# Patient Record
Sex: FEMALE | Race: WHITE | Employment: UNEMPLOYED | URBAN - METROPOLITAN AREA
[De-identification: names, ages, dates, MRNs, and addresses within clinical notes are randomized per-mention and may not be internally consistent; named-entity substitution may affect disease eponyms.]

---

## 2017-03-03 ENCOUNTER — OFFICE VISIT (OUTPATIENT)
Dept: FAMILY MEDICINE CLINIC | Age: 47
End: 2017-03-03

## 2017-03-03 VITALS
TEMPERATURE: 98.9 F | HEIGHT: 59 IN | RESPIRATION RATE: 12 BRPM | SYSTOLIC BLOOD PRESSURE: 131 MMHG | HEART RATE: 74 BPM | OXYGEN SATURATION: 99 % | BODY MASS INDEX: 24.8 KG/M2 | WEIGHT: 123 LBS | DIASTOLIC BLOOD PRESSURE: 82 MMHG

## 2017-03-03 DIAGNOSIS — H69.82 EUSTACHIAN TUBE DYSFUNCTION, LEFT: Primary | ICD-10-CM

## 2017-03-03 DIAGNOSIS — J02.9 PHARYNGITIS, UNSPECIFIED ETIOLOGY: ICD-10-CM

## 2017-03-03 RX ORDER — AMOXICILLIN 400 MG/5ML
500 POWDER, FOR SUSPENSION ORAL 3 TIMES DAILY
Qty: 189 ML | Refills: 0 | Status: SHIPPED | OUTPATIENT
Start: 2017-03-03 | End: 2017-03-13

## 2017-03-03 NOTE — MR AVS SNAPSHOT
Visit Information Date & Time Provider Department Dept. Phone Encounter #  
 3/3/2017  3:30 PM Vignesh Alannah, [facility] [phone] [id number] Follow-up Instructions Return if symptoms worsen or fail to improve. Upcoming Health Maintenance Date Due  
 PAP AKA CERVICAL CYTOLOGY 11/11/2018 DTaP/Tdap/Td series (2 - Td) 3/3/2027 Allergies as of 3/3/2017  Review Complete On: 3/3/2017 By: Vignesh Jaffe NP Severity Noted Reaction Type Reactions Augmentin [Amoxicillin-pot Clavulanate]  02/21/2014    Rash Current Immunizations  Never Reviewed No immunizations on file. Not reviewed this visit You Were Diagnosed With   
  
 Codes Comments Eustachian tube dysfunction, left    -  Primary ICD-10-CM: X39.81 ICD-9-CM: 381.81 Pharyngitis, unspecified etiology     ICD-10-CM: J02.9 ICD-9-CM: 198 Vitals BP Pulse Temp Resp Height(growth percentile) Weight(growth percentile) 131/82 (BP 1 Location: Left arm, BP Patient Position: Sitting) 74 98.9 °F (37.2 °C) (Oral) 12 4' 11\" (1.499 m) 123 lb (55.8 kg) LMP SpO2 BMI OB Status Smoking Status 02/03/2017 (Approximate) 99% 24.84 kg/m2 Having regular periods Never Smoker Vitals History BMI and BSA Data Body Mass Index Body Surface Area  
 24.84 kg/m 2 1.52 m 2 Preferred Pharmacy Pharmacy Name Phone [id number] [address] [place] [address] [phone] Your Updated Medication List  
  
   
This list is accurate as of: 3/3/17  4:10 PM.  Always use your most recent med list.  
  
  
  
  
 amoxicillin 400 mg/5 mL suspension Commonly known as:  AMOXIL Take 6.3 mL by mouth three (3) times daily for 10 days. azelaic acid 15 % topical gel Commonly known as:  [employer] Apply  to affected area two (2) times a day. ibuprofen 200 mg tablet Commonly known as:  MOTRIN  
 Take  by mouth. ondansetron hcl 4 mg tablet Commonly known as:  Marcos Marion Take 4 mg by mouth every eight (8) hours as needed for Nausea. PROBIOTIC 4X 10-15 mg Tbec Generic drug:  B.infantis-B.ani-B.long-B.bifi Take  by mouth. ZANTAC PO Take  by mouth. ZyrTEC 10 mg Cap Generic drug:  Cetirizine Take  by mouth. Prescriptions Sent to Pharmacy Refills  
 amoxicillin (AMOXIL) 400 mg/5 mL suspension 0 Sig: Take 6.3 mL by mouth three (3) times daily for 10 days. Class: Normal  
 Pharmacy: [facility] #: [phone] Route: Oral  
  
Follow-up Instructions Return if symptoms worsen or fail to improve. Patient Instructions Eustachian Tube Problems: Care Instructions Your Care Instructions The eustachian (say \"you-STAY-shee-un\") tubes run between the inside of the ears and the throat. They keep air pressure stable in the ears. If your eustachian tubes become blocked, the air pressure in your ears changes. The fluids from a cold can clog eustachian tubes, causing pain in the ears. A quick change in air pressure can cause eustachian tubes to close up. This might happen when an airplane changes altitude or when a  goes up or down underwater. Eustachian tube problems often clear up on their own or after antibiotic treatment. If your tubes continue to be blocked, you may need surgery. Follow-up care is a key part of your treatment and safety. Be sure to make and go to all appointments, and call your doctor if you are having problems. It's also a good idea to know your test results and keep a list of the medicines you take. How can you care for yourself at home? · To ease ear pain, apply a warm washcloth or a heating pad set on low. There may be some drainage from the ear when the heat melts earwax.  Put a cloth between the heat source and your skin. Do not use a heating pad with children. · If your doctor prescribed antibiotics, take them as directed. Do not stop taking them just because you feel better. You need to take the full course of antibiotics. · Your doctor may recommend over-the-counter medicine. Be safe with medicines. Oral or nasal decongestants may relieve ear pain. Avoid decongestants that are combined with antihistamines, which tend to cause more blockage. But if allergies seem to be the problem, your doctor may recommend a combination. Be careful with cough and cold medicines. Don't give them to children younger than 6, because they don't work for children that age and can even be harmful. For children 6 and older, always follow all the instructions carefully. Make sure you know how much medicine to give and how long to use it. And use the dosing device if one is included. When should you call for help? Call your doctor now or seek immediate medical care if: 
· You develop sudden, complete hearing loss. · You have severe pain or feel dizzy. · You have new or increasing pus or blood draining from your ear. · You have redness, swelling, or pain around or behind the ear. Watch closely for changes in your health, and be sure to contact your doctor if: 
· You do not get better after 2 weeks. · You have any new symptoms, such as itching or a feeling of fullness in the ear. Where can you learn more? Go to http://leigh-pinky.info/. Enter Y822 in the search box to learn more about \"Eustachian Tube Problems: Care Instructions. \" Current as of: July 29, 2016 Content Version: 11.1 © 6216-1512 [employer]. Care instructions adapted under license by [employer] (which disclaims liability or warranty for this information).  If you have questions about a medical condition or this instruction, always ask your healthcare professional. Rima Dinh, Incorporated disclaims any warranty or liability for your use of this information. Introducing [facility]! Dear Dudley Kater: Thank you for requesting a Opp.io account. Our records indicate that you already have an active Opp.io account. You can access your account anytime at [email or web address] Did you know that you can access your hospital and ER discharge instructions at any time in Opp.io? You can also review all of your test results from your hospital stay or ER visit. Additional Information If you have questions, please visit the Frequently Asked Questions section of the Opp.io website at [email or web address] Remember, Opp.io is NOT to be used for urgent needs. For medical emergencies, dial 911. Now available from your iPhone and Android! Please provide this summary of care documentation to your next provider. Your primary care clinician is listed as David Mckenzie. If you have any questions after today's visit, please call [phone].

## 2017-03-03 NOTE — PATIENT INSTRUCTIONS
Eustachian Tube Problems: Care Instructions  Your Care Instructions    The eustachian (say \"you-STAY-shee-un\") tubes run between the inside of the ears and the throat. They keep air pressure stable in the ears. If your eustachian tubes become blocked, the air pressure in your ears changes. The fluids from a cold can clog eustachian tubes, causing pain in the ears. A quick change in air pressure can cause eustachian tubes to close up. This might happen when an airplane changes altitude or when a  goes up or down underwater. Eustachian tube problems often clear up on their own or after antibiotic treatment. If your tubes continue to be blocked, you may need surgery. Follow-up care is a key part of your treatment and safety. Be sure to make and go to all appointments, and call your doctor if you are having problems. It's also a good idea to know your test results and keep a list of the medicines you take. How can you care for yourself at home? · To ease ear pain, apply a warm washcloth or a heating pad set on low. There may be some drainage from the ear when the heat melts earwax. Put a cloth between the heat source and your skin. Do not use a heating pad with children. · If your doctor prescribed antibiotics, take them as directed. Do not stop taking them just because you feel better. You need to take the full course of antibiotics. · Your doctor may recommend over-the-counter medicine. Be safe with medicines. Oral or nasal decongestants may relieve ear pain. Avoid decongestants that are combined with antihistamines, which tend to cause more blockage. But if allergies seem to be the problem, your doctor may recommend a combination. Be careful with cough and cold medicines. Don't give them to children younger than 6, because they don't work for children that age and can even be harmful. For children 6 and older, always follow all the instructions carefully.  Make sure you know how much medicine to give and how long to use it. And use the dosing device if one is included. When should you call for help? Call your doctor now or seek immediate medical care if:  · You develop sudden, complete hearing loss. · You have severe pain or feel dizzy. · You have new or increasing pus or blood draining from your ear. · You have redness, swelling, or pain around or behind the ear. Watch closely for changes in your health, and be sure to contact your doctor if:  · You do not get better after 2 weeks. · You have any new symptoms, such as itching or a feeling of fullness in the ear. Where can you learn more? Go to http://leigh-pinky.info/. Enter Y822 in the search box to learn more about \"Eustachian Tube Problems: Care Instructions. \"  Current as of: July 29, 2016  Content Version: 11.1  © 8231-6654 Healthwise, Incorporated. Care instructions adapted under license by [employer] (which disclaims liability or warranty for this information). If you have questions about a medical condition or this instruction, always ask your healthcare professional. [address] any warranty or liability for your use of this information.

## 2017-03-03 NOTE — PROGRESS NOTES
HISTORY OF PRESENT ILLNESS  Lori Schwartz is a 40 y.o. female. HPI  Ear Pain  Patient complains of left ear pain and sore throat. Onset of symptoms was 1 week ago, unchanged since that time. She also notes no hearing loss, no drainage. She does not have a history of ear infections. She took Advil with mild relief. Review of Systems   Constitutional: Positive for malaise/fatigue. Negative for chills and fever. HENT: Positive for congestion, ear pain and sore throat. Respiratory: Positive for cough and sputum production. Negative for shortness of breath and wheezing. Neurological: Negative for headaches. Physical Exam   Constitutional: She is oriented to person, place, and time. She appears well-developed and well-nourished. No distress. HENT:   Right Ear: Tympanic membrane and ear canal normal.   Left Ear: Tympanic membrane and ear canal normal.   Nose: Mucosal edema present. Right sinus exhibits no maxillary sinus tenderness and no frontal sinus tenderness. Left sinus exhibits no maxillary sinus tenderness and no frontal sinus tenderness. Mouth/Throat: Oropharynx is clear and moist.   Cardiovascular: Normal rate, regular rhythm and normal heart sounds. No murmur heard. Pulmonary/Chest: Effort normal and breath sounds normal. She has no decreased breath sounds. She has no wheezes. She has no rhonchi. Lymphadenopathy:     She has no cervical adenopathy. Neurological: She is alert and oriented to person, place, and time. Psychiatric: She has a normal mood and affect. Her behavior is normal.   Nursing note and vitals reviewed. ASSESSMENT and PLAN  Zahira Hernandez was seen today for sore throat and ear pain. Diagnoses and all orders for this visit:    Eustachian tube dysfunction, left  Flonase nasal spray daily. Increase fluids. Warm compresses. Will cover with Amoxicillin. -     amoxicillin (AMOXIL) 400 mg/5 mL suspension;  Take 6.3 mL by mouth three (3) times daily for 10 days.    Pharyngitis, unspecified etiology  Advil PRN. Increase fluids. I have discussed the diagnosis with the patient and the intended plan as seen in the above orders. The patient has received an after-visit summary and questions were answered concerning future plans. I have discussed medication side effects and warnings with the patient as well. Follow-up Disposition:  Return if symptoms worsen or fail to improve.

## 2017-03-03 NOTE — PROGRESS NOTES
1. Have you been to the ER, urgent care clinic since your last visit? Hospitalized since your last visit[facility]- ear pain    2. Have you seen or consulted any other health care providers outside of the [address] [place] [address] since your last visit? Include any pap smears or colon screening.  No      Chief Complaint   Patient presents with    Sore Throat    Ear Pain     left ear for the last weeks

## 2017-03-28 ENCOUNTER — OFFICE VISIT (OUTPATIENT)
Dept: FAMILY MEDICINE CLINIC | Age: 47
End: 2017-03-28

## 2017-03-28 VITALS
SYSTOLIC BLOOD PRESSURE: 111 MMHG | WEIGHT: 122.6 LBS | DIASTOLIC BLOOD PRESSURE: 78 MMHG | HEIGHT: 59 IN | BODY MASS INDEX: 24.72 KG/M2 | TEMPERATURE: 97.8 F | OXYGEN SATURATION: 98 % | RESPIRATION RATE: 16 BRPM | HEART RATE: 66 BPM

## 2017-03-28 DIAGNOSIS — Z88.9 H/O SEASONAL ALLERGIES: ICD-10-CM

## 2017-03-28 DIAGNOSIS — J02.9 SORE THROAT: Primary | ICD-10-CM

## 2017-03-28 LAB
S PYO AG THROAT QL: NEGATIVE
VALID INTERNAL CONTROL?: YES

## 2017-03-28 RX ORDER — MINERAL OIL
180 ENEMA (ML) RECTAL DAILY
Qty: 30 TAB | Refills: 3 | Status: SHIPPED | OUTPATIENT
Start: 2017-03-28

## 2017-03-28 RX ORDER — MOMETASONE FUROATE 50 UG/1
2 SPRAY, METERED NASAL DAILY
COMMUNITY

## 2017-03-28 NOTE — MR AVS SNAPSHOT
Visit Information Date & Time Provider Department Dept. Phone Encounter #  
 3/28/2017 10:40 AM Birgit Samaniego  [facility] [address] [facility] [address] [phone] [id number] Upcoming Health Maintenance Date Due  
 PAP AKA CERVICAL CYTOLOGY 11/11/2018 DTaP/Tdap/Td series (2 - Td) 3/3/2027 Allergies as of 3/28/2017  Review Complete On: 3/28/2017 By: Birgit Samaniego NP Severity Noted Reaction Type Reactions Augmentin [Amoxicillin-pot Clavulanate]  02/21/2014    Rash Current Immunizations  Never Reviewed No immunizations on file. Not reviewed this visit You Were Diagnosed With   
  
 Codes Comments Sore throat    -  Primary ICD-10-CM: J02.9 ICD-9-CM: 221 H/O seasonal allergies     ICD-10-CM: Z88.9 ICD-9-CM: V15.09 Vitals BP Pulse Temp Resp Height(growth percentile) Weight(growth percentile) 111/78 (BP 1 Location: Left arm, BP Patient Position: Sitting) 66 97.8 °F (36.6 °C) (Oral) 16 4' 11\" (1.499 m) 122 lb 9.6 oz (55.6 kg) LMP SpO2 BMI OB Status Smoking Status 03/13/2017 (Exact Date) 98% 24.76 kg/m2 Having regular periods Never Smoker Vitals History BMI and BSA Data Body Mass Index Body Surface Area 24.76 kg/m 2 1.52 m 2 Preferred Pharmacy Pharmacy Name Phone [id number] [place] [facility] [phone] Your Updated Medication List  
  
   
This list is accurate as of: 3/28/17 11:31 AM.  Always use your most recent med list.  
  
  
  
  
 azelaic acid 15 % topical gel Commonly known as:  [employer] Apply  to affected area two (2) times a day. fexofenadine 180 mg tablet Commonly known as:  [place] Take 1 Tab by mouth daily. Indications: SEASONAL ALLERGIC RHINITIS  
  
 ibuprofen 200 mg tablet Commonly known as:  MOTRIN Take  by mouth. NASONEX 50 mcg/actuation nasal spray Generic drug:  mometasone 2 Sprays daily. ondansetron hcl 4 mg tablet Commonly known as:  Joelle Loss Take 4 mg by mouth every eight (8) hours as needed for Nausea. PROBIOTIC 4X 10-15 mg Tbec Generic drug:  B.infantis-B.ani-B.long-B.bifi Take  by mouth. ZANTAC PO Take  by mouth. Prescriptions Sent to Pharmacy Refills  
 fexofenadine (ALLEGRA) 180 mg tablet 3 Sig: Take 1 Tab by mouth daily. Indications: SEASONAL ALLERGIC RHINITIS Class: Normal  
 Pharmacy: [facility] #: [phone] Route: Oral  
  
Patient Instructions Sore Throat: Care Instructions Your Care Instructions Infection by bacteria or a virus causes most sore throats. Cigarette smoke, dry air, air pollution, allergies, and yelling can also cause a sore throat. Sore throats can be painful and annoying. Fortunately, most sore throats go away on their own. If you have a bacterial infection, your doctor may prescribe antibiotics. Follow-up care is a key part of your treatment and safety. Be sure to make and go to all appointments, and call your doctor if you are having problems. It's also a good idea to know your test results and keep a list of the medicines you take. How can you care for yourself at home? · If your doctor prescribed antibiotics, take them as directed. Do not stop taking them just because you feel better. You need to take the full course of antibiotics. · Gargle with warm salt water once an hour to help reduce swelling and relieve discomfort. Use 1 teaspoon of salt mixed in 1 cup of warm water. · Take an over-the-counter pain medicine, such as acetaminophen (Tylenol), ibuprofen (Advil, Motrin), or naproxen (Aleve). Read and follow all instructions on the label. · Be careful when taking over-the-counter cold or flu medicines and Tylenol at the same time.  Many of these medicines have acetaminophen, which is Tylenol. Read the labels to make sure that you are not taking more than the recommended dose. Too much acetaminophen (Tylenol) can be harmful. · Drink plenty of fluids. Fluids may help soothe an irritated throat. Hot fluids, such as tea or soup, may help decrease throat pain. · Use over-the-counter throat lozenges to soothe pain. Regular cough drops or hard candy may also help. These should not be given to young children because of the risk of choking. · Do not smoke or allow others to smoke around you. If you need help quitting, talk to your doctor about stop-smoking programs and medicines. These can increase your chances of quitting for good. · Use a vaporizer or humidifier to add moisture to your bedroom. Follow the directions for cleaning the machine. When should you call for help? Call your doctor now or seek immediate medical care if: 
· You have new or worse trouble swallowing. · Your sore throat gets much worse on one side. Watch closely for changes in your health, and be sure to contact your doctor if you do not get better as expected. Where can you learn more? Go to http://leigh-pinky.info/. Enter 062 [id number] in the search box to learn more about \"Sore Throat: Care Instructions. \" Current as of: July 29, 2016 Content Version: 11.1 © 5875-2092 Sara Campbell, Incorporated. Care instructions adapted under license by [employer] (which disclaims liability or warranty for this information). If you have questions about a medical condition or this instruction, always ask your healthcare professional. [place] [address] any warranty or liability for your use of this information. Introducing [facility]! [facility]: Thank you for requesting a [employer] account. Our records indicate that you already have an active [employer] account. You can access your account anytime at [email or web address] Did you know that you can access your hospital and ER discharge instructions at any time in [employer]? You can also review all of your test results from your hospital stay or ER visit. Additional Information If you have questions, please visit the Frequently Asked Questions section of the [employer] website at [email or web address] Remember, [employer] is NOT to be used for urgent needs. For medical emergencies, dial 911. Now available from your iPhone and Android! Please provide this summary of care documentation to your next provider. Your primary care clinician is listed as Preeti Adas. If you have any questions after today's visit, please call [phone].

## 2017-03-28 NOTE — PROGRESS NOTES
Chief Complaint   Patient presents with    Sore Throat     sore throat, fatigue X 2 days      1. Have you been to the ER, urgent care clinic since your last visit? Hospitalized since your last visit? No    2. Have you seen or consulted any other health care providers outside of the [address] [place] [address] since your last visit? Include any pap smears or colon screening.  No

## 2017-03-28 NOTE — PROGRESS NOTES
HISTORY OF PRESENT ILLNESS  Fede Puentes is a 40 y.o. female. HPI  Patient presents to office today for sore throat that started over the past few days, mild discomfort. Today she complains of fatigue, both children tested positive. ROS  See above  Visit Vitals    /78 (BP 1 Location: Left arm, BP Patient Position: Sitting)    Pulse 66    Temp 97.8 °F (36.6 °C) (Oral)    Resp 16    Ht 4' 11\" (1.499 m)    Wt 122 lb 9.6 oz (55.6 kg)    LMP 03/13/2017 (Exact Date)    SpO2 98%    BMI 24.76 kg/m2     Current Outpatient Prescriptions on File Prior to Visit   Medication Sig Dispense Refill    B.infantis-B.ani-B.long-B.bifi (PROBIOTIC 4X) 10-15 mg TbEC Take  by mouth.  azelaic acid (FINACEA) 15 % topical gel Apply  to affected area two (2) times a day.  RANITIDINE HCL (ZANTAC PO) Take  by mouth.  Cetirizine (ZYRTEC) 10 mg cap Take  by mouth.  ibuprofen (MOTRIN) 200 mg tablet Take  by mouth.  ondansetron hcl (ZOFRAN) 4 mg tablet Take 4 mg by mouth every eight (8) hours as needed for Nausea. No current facility-administered medications on file prior to visit. No results found for this or any previous visit (from the past 12 hour(s)). Physical Exam   Constitutional: She is oriented to person, place, and time. She appears well-developed and well-nourished. HENT:   Head: Normocephalic and atraumatic. Neck: Normal range of motion. Cardiovascular: Normal rate and regular rhythm. Pulmonary/Chest: Effort normal and breath sounds normal.   Neurological: She is alert and oriented to person, place, and time. Skin: Skin is warm and dry. Psychiatric: She has a normal mood and affect. Strept test neg   ASSESSMENT and PLAN  Marie Santizo was seen today for sore throat.     Diagnoses and all orders for this visit:    Sore throat    Warm salt gargle up to 4 times if symptoms does not improve follow up with PCP    Justin Santizo NP

## 2017-03-29 ENCOUNTER — TELEPHONE (OUTPATIENT)
Dept: FAMILY MEDICINE CLINIC | Age: 47
End: 2017-03-29

## 2017-03-29 NOTE — TELEPHONE ENCOUNTER
Patient is calling, patient states that she would like a call back from ISAI sullivan in regards to what they spoke about in her appointment yesterday (fatigue). Patient states that she would like to request that some blood work orders be put into the system so that she can be tested for something that may be causing this.      Best call back # for patient: [phone]

## 2017-03-30 NOTE — TELEPHONE ENCOUNTER
Returned call to patient, adv her that she would need a second appointment, not to see acute care. Since PCP is out on leave, pt will make an appt with another provider. Patient/ Caller given an opportunity to ask questions, repeated information, and verbalized understanding.

## 2017-04-03 PROBLEM — J30.9 ALLERGIC RHINITIS: Status: ACTIVE | Noted: 2017-04-03

## 2017-10-23 ENCOUNTER — OFFICE VISIT (OUTPATIENT)
Dept: FAMILY MEDICINE CLINIC | Age: 47
End: 2017-10-23

## 2017-10-23 VITALS
HEART RATE: 81 BPM | RESPIRATION RATE: 18 BRPM | OXYGEN SATURATION: 98 % | WEIGHT: 123.2 LBS | TEMPERATURE: 99 F | DIASTOLIC BLOOD PRESSURE: 68 MMHG | HEIGHT: 59 IN | BODY MASS INDEX: 24.84 KG/M2 | SYSTOLIC BLOOD PRESSURE: 114 MMHG

## 2017-10-23 DIAGNOSIS — H53.8 BLURRY VISION, BILATERAL: ICD-10-CM

## 2017-10-23 DIAGNOSIS — N39.41 URGE INCONTINENCE OF URINE: ICD-10-CM

## 2017-10-23 DIAGNOSIS — M54.6 ACUTE MIDLINE THORACIC BACK PAIN: Primary | ICD-10-CM

## 2017-10-23 NOTE — PATIENT INSTRUCTIONS
Urge Incontinence in Women: Care Instructions  Your Care Instructions  Urge incontinence occurs when the need to urinate is so strong that you cannot reach the toilet in time, even when your bladder contains only a small amount of urine. This is also called overactive bladder or unstable bladder. Some women may have no warning before they leak urine. This condition does not cause major health problems, but it can be embarrassing and can affect a woman's self-esteem and confidence. Treatment can cure or improve your symptoms. Follow-up care is a key part of your treatment and safety. Be sure to make and go to all appointments, and call your doctor if you are having problems. It's also a good idea to know your test results and keep a list of the medicines you take. How can you care for yourself at home? · Take your medicines exactly as prescribed. Call your doctor if you think you are having a problem with your medicine. You will get more details on the specific medicines your doctor prescribes. · Limit caffeine and alcohol--they stimulate urine production. · Urinate every 2 to 4 hours during waking hours, even if you feel that you do not have to go. · Do pelvic floor (Kegel) exercises, which tighten and strengthen pelvic muscles. To do Kegel exercises:  ¨ Squeeze the same muscles you would use to stop your urine. Your belly and thighs should not move. ¨ Hold the squeeze for 3 seconds, then relax for 3 seconds. ¨ Start with 3 seconds. Then add 1 second each week until you are able to squeeze for 10 seconds. ¨ Repeat the exercise 10 to 15 times for each session. Do three or more sessions each day. · Try wearing pads that absorb the leaks. · Keep skin in the genital area dry. When should you call for help? Call your doctor now or seek immediate medical care if:  · You have symptoms of a urinary infection. For example:  ¨ You have blood or pus in your urine.   ¨ You have pain in your back just below your rib cage. This is called flank pain. ¨ You have a fever, chills, or body aches. ¨ It hurts to urinate. ¨ You have groin or belly pain. Watch closely for changes in your health, and be sure to contact your doctor if:  · You have a hard time urinating when your bladder feels full. · You feel like you need to urinate often. · Your bladder feels full even after you urinate. · Your symptoms are getting worse. Where can you learn more? Go to http://leigh-pinky.info/. Enter F819 in the search box to learn more about \"Urge Incontinence in Women: Care Instructions. \"  Current as of: October 13, 2016  Content Version: 11.3  © 7863-8579 [employer]. Care instructions adapted under license by [employer] (which disclaims liability or warranty for this information). If you have questions about a medical condition or this instruction, always ask your healthcare professional. [place] [address] any warranty or liability for your use of this information. Musculoskeletal Pain: Care Instructions  Your Care Instructions  Different problems with the bones, muscles, nerves, ligaments, and tendons in the body can cause pain. One or more areas of your body may ache or burn. Or they may feel tired, stiff, or sore. The medical term for this type of pain is musculoskeletal pain. It can have many different causes. Sometimes the pain is caused by an injury such as a strain or sprain. Or you might have pain from using one part of your body in the same way over and over again. This is called overuse. In some cases, the cause of the pain is another health problem such as arthritis or fibromyalgia. The doctor will examine you and ask you questions about your health to help find the cause of your pain. Blood tests or imaging tests like an X-ray may also be helpful. But sometimes doctors can't find a cause of the pain.   Treatment depends on your symptoms and the cause of the pain, if known. The doctor has checked you carefully, but problems can develop later. If you notice any problems or new symptoms, get medical treatment right away. Follow-up care is a key part of your treatment and safety. Be sure to make and go to all appointments, and call your doctor if you are having problems. It's also a good idea to know your test results and keep a list of the medicines you take. How can you care for yourself at home? · Rest until you feel better. · Do not do anything that makes the pain worse. Return to exercise gradually if you feel better and your doctor says it's okay. · Be safe with medicines. Read and follow all instructions on the label. ¨ If the doctor gave you a prescription medicine for pain, take it as prescribed. ¨ If you are not taking a prescription pain medicine, ask your doctor if you can take an over-the-counter medicine. · Put ice or a cold pack on the area for 10 to 20 minutes at a time to ease pain. Put a thin cloth between the ice and your skin. When should you call for help? Call your doctor now or seek immediate medical care if:  · You have new pain, or your pain gets worse. · You have new symptoms such as a fever, a rash, or chills. Watch closely for changes in your health, and be sure to contact your doctor if:  · You do not get better as expected. Where can you learn more? Go to http://leigh-pinky.info/. Enter G342 in the search box to learn more about \"Musculoskeletal Pain: Care Instructions. \"  Current as of: October 14, 2016  Content Version: 11.3  © 8842-9462 [employer]. Care instructions adapted under license by [employer] (which disclaims liability or warranty for this information). If you have questions about a medical condition or this instruction, always ask your healthcare professional. [address] any warranty or liability for your use of this information.

## 2017-10-23 NOTE — MR AVS SNAPSHOT
Visit Information Date & Time Provider Department Dept. Phone Encounter #  
 10/23/2017  3:00 PM Yfn Jackson  [facility] [phone] [id number] Upcoming Health Maintenance Date Due INFLUENZA AGE 9 TO ADULT 8/1/2017 PAP AKA CERVICAL CYTOLOGY 11/11/2018 DTaP/Tdap/Td series (2 - Td) 3/3/2027 Allergies as of 10/23/2017  Review Complete On: 10/23/2017 By: Billy Parkinson LPN Severity Noted Reaction Type Reactions Augmentin [Amoxicillin-pot Clavulanate]  02/21/2014    Rash Current Immunizations  Never Reviewed No immunizations on file. Not reviewed this visit You Were Diagnosed With   
  
 Codes Comments Acute midline thoracic back pain    -  Primary ICD-10-CM: M54.6 ICD-9-CM: 724.1 Urge incontinence of urine     ICD-10-CM: N39.41 
ICD-9-CM: 788.31 Blurry vision, bilateral     ICD-10-CM: H53.8 ICD-9-CM: 368.8 Vitals BP Pulse Temp Resp Height(growth percentile) Weight(growth percentile) 114/68 (BP 1 Location: Right arm, BP Patient Position: Sitting) 81 99 °F (37.2 °C) (Oral) 18 4' 11\" (1.499 m) 123 lb 3.2 oz (55.9 kg) LMP SpO2 BMI OB Status Smoking Status 09/30/2017 (Approximate) 98% 24.88 kg/m2 Having regular periods Never Smoker BMI and BSA Data Body Mass Index Body Surface Area  
 24.88 kg/m 2 1.53 m 2 Preferred Pharmacy Pharmacy Name Phone [id number] [phone] Your Updated Medication List  
  
   
This list is accurate as of: 10/23/17  4:06 PM.  Always use your most recent med list.  
  
  
  
  
 azelaic acid 15 % topical gel Commonly known as:  [employer] Apply  to affected area two (2) times a day. fexofenadine 180 mg tablet Commonly known as:  Lucius Willis Take 1 Tab by mouth daily. Indications: SEASONAL ALLERGIC RHINITIS  
  
 ibuprofen 200 mg tablet Commonly known as:  MOTRIN Take  by mouth. NASONEX 50 mcg/actuation nasal spray Generic drug:  mometasone 2 Sprays daily. ondansetron hcl 4 mg tablet Commonly known as:  Jesus Manuel Card Take 4 mg by mouth every eight (8) hours as needed for Nausea. PROBIOTIC 4X 10-15 mg Tbec Generic drug:  B.infantis-B.ani-B.long-B.bifi Take  by mouth. ZANTAC PO Take  by mouth. To-Do List   
 10/23/2017 Imaging:  XR SPINE THORAC 3 V Patient Instructions Urge Incontinence in Women: Care Instructions Your Care Instructions Urge incontinence occurs when the need to urinate is so strong that you cannot reach the toilet in time, even when your bladder contains only a small amount of urine. This is also called overactive bladder or unstable bladder. Some women may have no warning before they leak urine. This condition does not cause major health problems, but it can be embarrassing and can affect a woman's self-esteem and confidence. Treatment can cure or improve your symptoms. Follow-up care is a key part of your treatment and safety. Be sure to make and go to all appointments, and call your doctor if you are having problems. It's also a good idea to know your test results and keep a list of the medicines you take. How can you care for yourself at home? · Take your medicines exactly as prescribed. Call your doctor if you think you are having a problem with your medicine. You will get more details on the specific medicines your doctor prescribes. · Limit caffeine and alcoholthey stimulate urine production. · Urinate every 2 to 4 hours during waking hours, even if you feel that you do not have to go. · Do pelvic floor (Kegel) exercises, which tighten and strengthen pelvic muscles. To do Kegel exercises: 
¨ Squeeze the same muscles you would use to stop your urine. Your belly and thighs should not move. ¨ Hold the squeeze for 3 seconds, then relax for 3 seconds. ¨ Start with 3 seconds. Then add 1 second each week until you are able to squeeze for 10 seconds. ¨ Repeat the exercise 10 to 15 times for each session. Do three or more sessions each day. · Try wearing pads that absorb the leaks. · Keep skin in the genital area dry. When should you call for help? Call your doctor now or seek immediate medical care if: 
· You have symptoms of a urinary infection. For example: ¨ You have blood or pus in your urine. ¨ You have pain in your back just below your rib cage. This is called flank pain. ¨ You have a fever, chills, or body aches. ¨ It hurts to urinate. ¨ You have groin or belly pain. Watch closely for changes in your health, and be sure to contact your doctor if: 
· You have a hard time urinating when your bladder feels full. · You feel like you need to urinate often. · Your bladder feels full even after you urinate. · Your symptoms are getting worse. Where can you learn more? Go to http://leigh-pinky.info/. Enter M974 in the search box to learn more about \"Urge Incontinence in Women: Care Instructions. \" Current as of: October 13, 2016 Content Version: 11.3 © 7804-0782 [employer]. Care instructions adapted under license by [employer] (which disclaims liability or warranty for this information). If you have questions about a medical condition or this instruction, always ask your healthcare professional. [place] [address] any warranty or liability for your use of this information. Musculoskeletal Pain: Care Instructions Your Care Instructions Different problems with the bones, muscles, nerves, ligaments, and tendons in the body can cause pain. One or more areas of your body may ache or burn. Or they may feel tired, stiff, or sore. The medical term for this type of pain is musculoskeletal pain. It can have many different causes. Sometimes the pain is caused by an injury such as a strain or sprain. Or you might have pain from using one part of your body in the same way over and over again. This is called overuse. In some cases, the cause of the pain is another health problem such as arthritis or fibromyalgia. The doctor will examine you and ask you questions about your health to help find the cause of your pain. Blood tests or imaging tests like an X-ray may also be helpful. But sometimes doctors can't find a cause of the pain. Treatment depends on your symptoms and the cause of the pain, if known. The doctor has checked you carefully, but problems can develop later. If you notice any problems or new symptoms, get medical treatment right away. Follow-up care is a key part of your treatment and safety. Be sure to make and go to all appointments, and call your doctor if you are having problems. It's also a good idea to know your test results and keep a list of the medicines you take. How can you care for yourself at home? · Rest until you feel better. · Do not do anything that makes the pain worse. Return to exercise gradually if you feel better and your doctor says it's okay. · Be safe with medicines. Read and follow all instructions on the label. ¨ If the doctor gave you a prescription medicine for pain, take it as prescribed. ¨ If you are not taking a prescription pain medicine, ask your doctor if you can take an over-the-counter medicine. · Put ice or a cold pack on the area for 10 to 20 minutes at a time to ease pain. Put a thin cloth between the ice and your skin. When should you call for help? Call your doctor now or seek immediate medical care if: 
· You have new pain, or your pain gets worse. · You have new symptoms such as a fever, a rash, or chills. Watch closely for changes in your health, and be sure to contact your doctor if: 
· You do not get better as expected. Where can you learn more? Go to http://leigh-pinky.info/. Enter Q865 in the search box to learn more about \"Musculoskeletal Pain: Care Instructions. \" Current as of: October 14, 2016 Content Version: 11.3 © 3583-2157 [employer] Care instructions adapted under license by [employer] (which disclaims liability or warranty for this information). If you have questions about a medical condition or this instruction, always ask your healthcare professional. [address] any warranty or liability for your use of this information. Introducing [facility]! Dear Dania: Thank you for requesting a [employer] account. Our records indicate that you already have an active [employer] account. You can access your account anytime at [email or web address] Did you know that you can access your hospital and ER discharge instructions at any time in [employer]? You can also review all of your test results from your hospital stay or ER visit. Additional Information If you have questions, please visit the Frequently Asked Questions section of the [employer] website at [email or web address] Remember, [employer] is NOT to be used for urgent needs. For medical emergencies, dial 911. Now available from your iPhone and Android! Please provide this summary of care documentation to your next provider. Your primary care clinician is listed as Kimne Michael. If you have any questions after today's visit, please call [phone].

## 2017-10-23 NOTE — PROGRESS NOTES
Cameron Mahajan [facility] Note      Subjective:     Chief Complaint   Patient presents with    Back Pain     Patient had some middle back pain on Saturday. Patient describes the pain as exuciating and patient had to get down on the floor because of the pain. Patient states that she took some advil and rested the rest of the day. Patient has not had another episode since then.  Other     Patient had some left sided lip numbness last week, and patient wants to discuss.  Other     2 weeks ago patient had urinary problem and used the restroom on herself  while out at the store. Zane Fleischer is a 56y.o. year old female who presents for evaluation of the following:    Severe Pain in Mid Back  Saturday, normal morning,. Reached into refrigerator then acute onset severe back pain  Lowered to the floor due to pain. Lasted 1 hour  Mid back, rated 10/10  Almost lost her bowel during this episode  Associated symptoms SOB, relaxation helped. Was able to stand and tried Advil + rest with relief  Next day no pain. Endorse history of breathing issues with no diagnosis, denies asthma or lung disease evaluated uted by a pulmonologist  Denies history of back pain, no recent stressors, leg weakness, pain in legs. Left lower lip numbness  - Associated twitching on hits own intermittent 1 week ago for 1 week. Last occurrence yesterday. - Denies history of cold sores, facial numbness lip/facial weakness, pain radiation  - Endorses history of other peripheral neuropathy    Urinary incontinence   - 2 weeks ago uncontrolled urine loss in the store. - Known incontinence after deliveries    Vertigo: Chronic worsening in past 1 month. Usually resolves on its own.      Vision:  Dramatically worse, time course not provided, patient stating \"I cannot given you a time frame\"  Food blurry on plate last night  Last seen an ophthalmologist > 1 year ago and got prescription reading glasses  Endorses grandmother with glaucoma. Denies current visual disturbance        Review of Systems   Pertinent positives and negative per HPI. All other systems  reviewed are negative for a Comprehensive ROS (10+). Past Medical History:   Diagnosis Date    Gastroparesis     Ovarian cyst, left     Shortness of breath     Normal Spirometry and Pulmonary Evaluation        Social History     Social History    Marital status:      Spouse name: N/A    Number of children: N/A    Years of education: N/A     Occupational History    Not on file. Social History Main Topics    Smoking status: Never Smoker    Smokeless tobacco: Never Used    Alcohol use No    Drug use: No    Sexual activity: Yes     Partners: Male     Birth control/ protection: None      Comment: vasectomy     Other Topics Concern    Not on file     Social History Narrative         Objective:     Vitals:    10/23/17 1522   BP: 114/68   Pulse: 81   Resp: 18   Temp: 99 °F (37.2 °C)   TempSrc: Oral   SpO2: 98%   Weight: 123 lb 3.2 oz (55.9 kg)   Height: 4' 11\" (1.499 m)       Physical Examination:  General: Alert, cooperative, no distress, appears stated age. Eyes: Conjunctivae/corneas clear. PERRL, EOMs intact. Fundoscopy attempted by unable due to pupil constriction. Ears: Normal external ear canals both ears. Nose: Nares normal. Septum midline. Mucosa normal. No drainage or sinus tenderness. Mouth/Throat: Lips, mucosa, and tongue normal. Teeth and gums normal.  Neck: Supple, symmetrical, trachea midline, no adenopathy. No thyroid enlargement/tenderness/nodules  Back: Nontender. Symmetric, no curvature. ROM normal. No CVA tenderness. Lungs: Clear to auscultation bilaterally. Normal inspiratory and expiratory ratio. Heart: Regular rate and rhythm, S1, S2 normal, no murmur, click, rub or gallop. Abdomen: Soft, non-tender. Bowel sounds normal. No masses or organomegaly.   Extremities: Extremities normal, atraumatic, no cyanosis or edema.  Pulses: 2+ and symmetric all extremities. Skin: Skin color, texture, turgor normal. No rashes or lesions  Lymph nodes: Cervical, supraclavicular nodes normal.  Neurologic: CNII-XII intact. Strength 5/5 grossly. Sensation and reflexes normal throughout. No labs in past 1 year. Assessment/ Plan:   Diagnoses and all orders for this visit:    1. Acute midline thoracic back pain:  Acute and brief pain. Likely MSK pain or rhomboid muscle spasm. No neurologic sign/sx or red flag symptoms. Will get spinal Xray and labs to eval given acute nature. -     XR SPINE THORAC 3 V; Future  -     CBC WITH AUTOMATED DIFF  -     METABOLIC PANEL, COMPREHENSIVE  -     MAGNESIUM    2. Urge incontinence of urine: acute on chronic. Likely related to chronic disorder with one time episode of incontinence. See your urologist if this recurs. 3. Blurry vision, bilateral: Chronic course. Follow up with  Opthalmology. 4. Lip Numbness: Unclear etiology. RTC if continue >2 weeks. May need gabapentin to treat. 5. Vertigo: Chronic. Not interested in medications. Continue conservative management with behavioral modifications- slow position changes. I have discussed the diagnosis with the patient and the intended plan as seen in the above orders. The patient has received an after-visit summary and questions were answered concerning future plans. I have discussed medication side effects and warnings with the patient as well. Follow-up Disposition:  Return in about 3 months (around 1/23/2018).       Signed,    Wei Sibley MD  10/23/2017

## 2017-10-23 NOTE — PROGRESS NOTES
Chief Complaint   Patient presents with    Back Pain     Patient had some middle back pain on Saturday. Patient describes the pain as exuciating and patient had to get down on the floor because of the pain. Patient states that she took some advil and rested the rest of the day. Patient has not had another episode since then.  Other     Patient had some left sided lip numbness last week, and patient wants to discuss.  Other     2 weeks ago patient had urinary problem and used the restroom on herself  while out at the store. 1. Have you been to the ER, urgent care clinic since your last visit? Hospitalized since your last visit? No    2. Have you seen or consulted any other health care providers outside of the [address] [place] [address] since your last visit? Include any pap smears or colon screening.  No

## 2017-10-24 LAB
ALBUMIN SERPL-MCNC: 4.4 G/DL (ref 3.5–5.5)
ALBUMIN/GLOB SERPL: 2.2 {RATIO} (ref 1.2–2.2)
ALP SERPL-CCNC: 41 IU/L (ref 39–117)
ALT SERPL-CCNC: 17 IU/L (ref 0–32)
AST SERPL-CCNC: 19 IU/L (ref 0–40)
BASOPHILS # BLD AUTO: 0 X10E3/UL (ref 0–0.2)
BASOPHILS NFR BLD AUTO: 0 %
BILIRUB SERPL-MCNC: 0.3 MG/DL (ref 0–1.2)
BUN SERPL-MCNC: 16 MG/DL (ref 6–24)
BUN/CREAT SERPL: 22 (ref 9–23)
CALCIUM SERPL-MCNC: 8.8 MG/DL (ref 8.7–10.2)
CHLORIDE SERPL-SCNC: 103 MMOL/L (ref 96–106)
CO2 SERPL-SCNC: 24 MMOL/L (ref 18–29)
CREAT SERPL-MCNC: 0.74 MG/DL (ref 0.57–1)
EOSINOPHIL # BLD AUTO: 0.1 X10E3/UL (ref 0–0.4)
EOSINOPHIL NFR BLD AUTO: 1 %
ERYTHROCYTE [DISTWIDTH] IN BLOOD BY AUTOMATED COUNT: 14.2 % (ref 12.3–15.4)
GFR SERPLBLD CREATININE-BSD FMLA CKD-EPI: 112 ML/MIN/1.73
GFR SERPLBLD CREATININE-BSD FMLA CKD-EPI: 97 ML/MIN/1.73
GLOBULIN SER CALC-MCNC: 2 G/DL (ref 1.5–4.5)
GLUCOSE SERPL-MCNC: 71 MG/DL (ref 65–99)
HCT VFR BLD AUTO: 40.9 % (ref 34–46.6)
HGB BLD-MCNC: 13.7 G/DL (ref 11.1–15.9)
IMM GRANULOCYTES # BLD: 0 X10E3/UL (ref 0–0.1)
IMM GRANULOCYTES NFR BLD: 0 %
LYMPHOCYTES # BLD AUTO: 2.6 X10E3/UL (ref 0.7–3.1)
LYMPHOCYTES NFR BLD AUTO: 31 %
MAGNESIUM SERPL-MCNC: 2.2 MG/DL (ref 1.6–2.3)
MCH RBC QN AUTO: 29.9 PG (ref 26.6–33)
MCHC RBC AUTO-ENTMCNC: 33.5 G/DL (ref 31.5–35.7)
MCV RBC AUTO: 89 FL (ref 79–97)
MONOCYTES # BLD AUTO: 0.5 X10E3/UL (ref 0.1–0.9)
MONOCYTES NFR BLD AUTO: 6 %
NEUTROPHILS # BLD AUTO: 5.2 X10E3/UL (ref 1.4–7)
NEUTROPHILS NFR BLD AUTO: 62 %
PLATELET # BLD AUTO: 241 X10E3/UL (ref 150–379)
POTASSIUM SERPL-SCNC: 4.7 MMOL/L (ref 3.5–5.2)
PROT SERPL-MCNC: 6.4 G/DL (ref 6–8.5)
RBC # BLD AUTO: 4.58 X10E6/UL (ref 3.77–5.28)
SODIUM SERPL-SCNC: 141 MMOL/L (ref 134–144)
WBC # BLD AUTO: 8.3 X10E3/UL (ref 3.4–10.8)

## 2017-10-25 NOTE — PROGRESS NOTES
Called pt back today and made her aware that lab results/Xray results are all normal, understanding voiced.

## 2018-01-20 ENCOUNTER — OFFICE VISIT (OUTPATIENT)
Dept: FAMILY MEDICINE CLINIC | Age: 48
End: 2018-01-20

## 2018-01-20 VITALS
WEIGHT: 122.4 LBS | SYSTOLIC BLOOD PRESSURE: 130 MMHG | HEART RATE: 91 BPM | DIASTOLIC BLOOD PRESSURE: 79 MMHG | BODY MASS INDEX: 24.68 KG/M2 | TEMPERATURE: 100.6 F | HEIGHT: 59 IN | RESPIRATION RATE: 16 BRPM | OXYGEN SATURATION: 99 %

## 2018-01-20 DIAGNOSIS — R06.02 SOB (SHORTNESS OF BREATH): ICD-10-CM

## 2018-01-20 DIAGNOSIS — J06.9 UPPER RESPIRATORY TRACT INFECTION, UNSPECIFIED TYPE: Primary | ICD-10-CM

## 2018-01-20 DIAGNOSIS — R05.9 COUGH: ICD-10-CM

## 2018-01-20 DIAGNOSIS — J11.1 INFLUENZA: ICD-10-CM

## 2018-01-20 RX ORDER — CODEINE PHOSPHATE AND GUAIFENESIN 10; 100 MG/5ML; MG/5ML
5 SOLUTION ORAL
Qty: 120 ML | Refills: 0 | Status: SHIPPED | OUTPATIENT
Start: 2018-01-20

## 2018-01-20 RX ORDER — AZITHROMYCIN 250 MG/1
TABLET, FILM COATED ORAL
Qty: 6 TAB | Refills: 0 | Status: SHIPPED | OUTPATIENT
Start: 2018-01-20 | End: 2018-01-25

## 2018-01-20 RX ORDER — OSELTAMIVIR PHOSPHATE 75 MG/1
CAPSULE ORAL
COMMUNITY
Start: 2018-01-19

## 2018-01-20 NOTE — MR AVS SNAPSHOT
[address] 
[phone] Patient: Jeremías Mccullough MRN: [id number] Visit Information Date & Time Provider Department Dept. Phone Encounter #  
 1/20/2018 11:00 AM Dinesh Dimas  [place] [phone] [id number] Follow-up Instructions Return in about 1 week (around 1/27/2018), or if symptoms worsen or fail to improve, for uri. Upcoming Health Maintenance Date Due Influenza Age 5 to Adult 8/1/2017 PAP AKA CERVICAL CYTOLOGY 11/11/2018 DTaP/Tdap/Td series (2 - Td) 3/3/2027 Allergies as of 1/20/2018  Review Complete On: 1/20/2018 By: Dinesh Dimas MD  
  
 Severity Noted Reaction Type Reactions Augmentin [Amoxicillin-pot Clavulanate]  02/21/2014    Rash Current Immunizations  Never Reviewed No immunizations on file. Not reviewed this visit You Were Diagnosed With   
  
 Codes Comments Upper respiratory tract infection, unspecified type    -  Primary ICD-10-CM: J06.9 ICD-9-CM: 465.9 Influenza     ICD-10-CM: J11.1 ICD-9-CM: 073.8 Cough     ICD-10-CM: R05 ICD-9-CM: 786.2 SOB (shortness of breath)     ICD-10-CM: R06.02 
ICD-9-CM: 786.05 Vitals BP Pulse Temp Resp Height(growth percentile) Weight(growth percentile) 130/79 (BP 1 Location: Right arm, BP Patient Position: Sitting) 91 (!) 100.6 °F (38.1 °C) (Oral) 16 4' 11\" (1.499 m) 122 lb 6.4 oz (55.5 kg) LMP SpO2 BMI OB Status Smoking Status 12/25/2017 99% 24.72 kg/m2 Having regular periods Never Smoker BMI and BSA Data Body Mass Index Body Surface Area 24.72 kg/m 2 1.52 m 2 Preferred Pharmacy Pharmacy Name Phone [id number] [place] [phone] Your Updated Medication List  
  
   
 This list is accurate as of: 1/20/18 12:16 PM.  Always use your most recent med list.  
  
  
  
  
 azelaic acid 15 % topical gel Commonly known as:  ScheduleSoft Apply  to affected area two (2) times a day. azithromycin 250 mg tablet Commonly known as:  Mandie Singh Take 2 tablets today, then take 1 tablet daily  
  
 fexofenadine 180 mg tablet Commonly known as:  José Miguel [place] Take 1 Tab by mouth daily. Indications: SEASONAL ALLERGIC RHINITIS  
  
 guaiFENesin-codeine 100-10 mg/5 mL solution Commonly known as:  ROBITUSSIN AC Take 5 mL by mouth three (3) times daily as needed for Cough. Max Daily Amount: 15 mL. Indications: COLD SYMPTOMS, Cough  
  
 ibuprofen 200 mg tablet Commonly known as:  MOTRIN Take  by mouth. NASONEX 50 mcg/actuation nasal spray Generic drug:  mometasone 2 Sprays daily. ondansetron hcl 4 mg tablet Commonly known as:  Joenathan Mohs Take 4 mg by mouth every eight (8) hours as needed for Nausea. oseltamivir 75 mg capsule Commonly known as:  TAMIFLU PROBIOTIC 4X 10-15 mg Tbec Generic drug:  B.infantis-B.ani-B.long-B.bifi Take  by mouth. ZANTAC PO Take  by mouth. Prescriptions Printed Refills  
 guaiFENesin-codeine (ROBITUSSIN AC) 100-10 mg/5 mL solution 0 Sig: Take 5 mL by mouth three (3) times daily as needed for Cough. Max Daily Amount: 15 mL. Indications: COLD SYMPTOMS, Cough Class: Print Route: Oral  
  
Prescriptions Sent to Pharmacy Refills  
 azithromycin (ZITHROMAX) 250 mg tablet 0 Sig: Take 2 tablets today, then take 1 tablet daily Class: Normal  
 Pharmacy: [facility] #: [phone] Follow-up Instructions Return in about 1 week (around 1/27/2018), or if symptoms worsen or fail to improve, for uri. To-Do List   
 01/20/2018 Imaging:  XR CHEST PA LAT Patient Instructions Upper Respiratory Infection (Cold): Care Instructions Your Care Instructions An upper respiratory infection, or URI, is an infection of the nose, sinuses, or throat. URIs are spread by coughs, sneezes, and direct contact. The common cold is the most frequent kind of URI. The flu and sinus infections are other kinds of URIs. Almost all URIs are caused by viruses. Antibiotics won't cure them. But you can treat most infections with home care. This may include drinking lots of fluids and taking over-the-counter pain medicine. You will probably feel better in 4 to 10 days. The doctor has checked you carefully, but problems can develop later. If you notice any problems or new symptoms, get medical treatment right away. Follow-up care is a key part of your treatment and safety. Be sure to make and go to all appointments, and call your doctor if you are having problems. It's also a good idea to know your test results and keep a list of the medicines you take. How can you care for yourself at home? · To prevent dehydration, drink plenty of fluids, enough so that your urine is light yellow or clear like water. Choose water and other caffeine-free clear liquids until you feel better. If you have kidney, heart, or liver disease and have to limit fluids, talk with your doctor before you increase the amount of fluids you drink. · Take an over-the-counter pain medicine, such as acetaminophen (Tylenol), ibuprofen (Advil, Motrin), or naproxen (Aleve). Read and follow all instructions on the label. · Before you use cough and cold medicines, check the label. These medicines may not be safe for young children or for people with certain health problems. · Be careful when taking over-the-counter cold or flu medicines and Tylenol at the same time. Many of these medicines have acetaminophen, which is Tylenol.  Read the labels to make sure that you are not taking more than the recommended dose. Too much acetaminophen (Tylenol) can be harmful. · Get plenty of rest. 
· Do not smoke or allow others to smoke around you. If you need help quitting, talk to your doctor about stop-smoking programs and medicines. These can increase your chances of quitting for good. When should you call for help? Call 911 anytime you think you may need emergency care. For example, call if: 
? · You have severe trouble breathing. ?Call your doctor now or seek immediate medical care if: 
? · You seem to be getting much sicker. ? · You have new or worse trouble breathing. ? · You have a new or higher fever. ? · You have a new rash. ? Watch closely for changes in your health, and be sure to contact your doctor if: 
? · You have a new symptom, such as a sore throat, an earache, or sinus pain. ? · You cough more deeply or more often, especially if you notice more mucus or a change in the color of your mucus. ? · You do not get better as expected. Where can you learn more? Go to http://leigh-pinky.info/. Enter K110 in the search box to learn more about \"Upper Respiratory Infection (Cold): Care Instructions. \" Current as of: May 12, 2017 Content Version: 11.4 © 6530-3257 Healthwise, StyleSeek. Care instructions adapted under license by [employer] (which disclaims liability or warranty for this information). If you have questions about a medical condition or this instruction, always ask your healthcare professional. [place] [address] any warranty or liability for your use of this information. Introducing \[facility]\"" & HEALTH SERVICES! Dear Sherry Holland: Thank you for requesting a [employer] account. Our records indicate that you already have an active [employer] account. You can access your account anytime at [email or web address] Did you know that you can access your hospital and ER discharge instructions at any time in KissMyAdst? You can also review all of your test results from your hospital stay or ER visit. Additional Information If you have questions, please visit the Frequently Asked Questions section of the [employer] website at [email or web address] Remember, Inn[employer]ub is NOT to be used for urgent needs. For medical emergencies, dial 911. Now available from your iPhone and Android! Please provide this summary of care documentation to your next provider. Your primary care clinician is listed as Murtis Hallmark. If you have any questions after today's visit, please call [phone].

## 2018-01-20 NOTE — PATIENT INSTRUCTIONS
Upper Respiratory Infection (Cold): Care Instructions  Your Care Instructions    An upper respiratory infection, or URI, is an infection of the nose, sinuses, or throat. URIs are spread by coughs, sneezes, and direct contact. The common cold is the most frequent kind of URI. The flu and sinus infections are other kinds of URIs. Almost all URIs are caused by viruses. Antibiotics won't cure them. But you can treat most infections with home care. This may include drinking lots of fluids and taking over-the-counter pain medicine. You will probably feel better in 4 to 10 days. The doctor has checked you carefully, but problems can develop later. If you notice any problems or new symptoms, get medical treatment right away. Follow-up care is a key part of your treatment and safety. Be sure to make and go to all appointments, and call your doctor if you are having problems. It's also a good idea to know your test results and keep a list of the medicines you take. How can you care for yourself at home? · To prevent dehydration, drink plenty of fluids, enough so that your urine is light yellow or clear like water. Choose water and other caffeine-free clear liquids until you feel better. If you have kidney, heart, or liver disease and have to limit fluids, talk with your doctor before you increase the amount of fluids you drink. · Take an over-the-counter pain medicine, such as acetaminophen (Tylenol), ibuprofen (Advil, Motrin), or naproxen (Aleve). Read and follow all instructions on the label. · Before you use cough and cold medicines, check the label. These medicines may not be safe for young children or for people with certain health problems. · Be careful when taking over-the-counter cold or flu medicines and Tylenol at the same time. Many of these medicines have acetaminophen, which is Tylenol. Read the labels to make sure that you are not taking more than the recommended dose.  Too much acetaminophen (Tylenol) can be harmful. · Get plenty of rest.  · Do not smoke or allow others to smoke around you. If you need help quitting, talk to your doctor about stop-smoking programs and medicines. These can increase your chances of quitting for good. When should you call for help? Call 911 anytime you think you may need emergency care. For example, call if:  ? · You have severe trouble breathing. ?Call your doctor now or seek immediate medical care if:  ? · You seem to be getting much sicker. ? · You have new or worse trouble breathing. ? · You have a new or higher fever. ? · You have a new rash. ? Watch closely for changes in your health, and be sure to contact your doctor if:  ? · You have a new symptom, such as a sore throat, an earache, or sinus pain. ? · You cough more deeply or more often, especially if you notice more mucus or a change in the color of your mucus. ? · You do not get better as expected. Where can you learn more? Go to http://leigh-pinky.info/. Enter V120 in the search box to learn more about \"Upper Respiratory Infection (Cold): Care Instructions. \"  Current as of: May 12, 2017  Content Version: 11.4  © 3330-5008 Healthwise, Incorporated. Care instructions adapted under license by [employer] (which disclaims liability or warranty for this information). If you have questions about a medical condition or this instruction, always ask your healthcare professional. [place] [address] any warranty or liability for your use of this information.

## 2018-01-20 NOTE — PROGRESS NOTES
HISTORY OF PRESENT ILLNESS  Steven Powers is a 56 y.o. female. Blood pressure 130/79, pulse 91, temperature (!) 100.6 °F (38.1 °C), temperature source Oral, resp. rate 16, height 4' 11\" (1.499 m), weight 122 lb 6.4 oz (55.5 kg), last menstrual period 12/25/2017, SpO2 99 %. Body mass index is 24.72 kg/(m^2). Chief Complaint   Patient presents with    Cough     Family has type A Flu , she was not tested by giving Tamiflu , started yesterday. States painful chest all across, hurts to breathe and take deep breath        HPI  Steven Powers 56 y.o. female  presents to the office today for a cough. Cough: Pt states that everyone in her house currently has Type A flu. Pt started on a course of Tamiflu yesterday. She reports that she is currently having chest pain and states that it hurts to take a deep breath. Pt is currently having a productive cough with brown mucus, fevers, and chills. She notes that she has previously had Pneumonia. She denies any sinus pain, ear pain, or sore throat. She reports that she has fluid in her ears and will see an ENT. She is not currently taking any medications for her cough. Will start pt on Azithromycin 250mg and Robitussin AC for her cough. Current Outpatient Prescriptions   Medication Sig Dispense Refill    oseltamivir (TAMIFLU) 75 mg capsule       azithromycin (ZITHROMAX) 250 mg tablet Take 2 tablets today, then take 1 tablet daily 6 Tab 0    guaiFENesin-codeine (ROBITUSSIN AC) 100-10 mg/5 mL solution Take 5 mL by mouth three (3) times daily as needed for Cough. Max Daily Amount: 15 mL. Indications: COLD SYMPTOMS, Cough 120 mL 0    mometasone (NASONEX) 50 mcg/actuation nasal spray 2 Sprays daily.  fexofenadine (ALLEGRA) 180 mg tablet Take 1 Tab by mouth daily. Indications: SEASONAL ALLERGIC RHINITIS 30 Tab 3    RANITIDINE HCL (ZANTAC PO) Take  by mouth.  B.infantis-B.ani-B.long-B.bifi (PROBIOTIC 4X) 10-15 mg TbEC Take  by mouth.       ibuprofen (MOTRIN) 200 mg tablet Take  by mouth.  azelaic acid (FINACEA) 15 % topical gel Apply  to affected area two (2) times a day.  ondansetron hcl (ZOFRAN) 4 mg tablet Take 4 mg by mouth every eight (8) hours as needed for Nausea. Allergies   Allergen Reactions    Augmentin [Amoxicillin-Pot Clavulanate] Rash     Past Medical History:   Diagnosis Date    Gastroparesis     Ovarian cyst, left     Shortness of breath     Normal Spirometry and Pulmonary Evaluation     Past Surgical History:   Procedure Laterality Date    HX CHOLECYSTECTOMY       Family History   Problem Relation Age of Onset    Hypertension Father     Arthritis-osteo Father     Heart Disease Maternal Grandfather      Social History   Substance Use Topics    Smoking status: Never Smoker    Smokeless tobacco: Never Used    Alcohol use No        Review of Systems   Constitutional: Positive for chills and fever. Negative for malaise/fatigue. HENT: Negative for ear pain, sinus pain and sore throat. Eyes: Negative for blurred vision. Respiratory: Positive for cough and sputum production (brown). Negative for shortness of breath. Cardiovascular: Positive for chest pain (with deep breath). Negative for leg swelling. Musculoskeletal: Negative. Neurological: Negative. Negative for dizziness and headaches. All other systems reviewed and are negative. Physical Exam   Constitutional: She is oriented to person, place, and time. She appears well-developed and well-nourished. No distress. HENT:   Head: Normocephalic and atraumatic. Right Ear: A middle ear effusion is present. Left Ear: A middle ear effusion is present. Nose: Nose normal. Right sinus exhibits no maxillary sinus tenderness and no frontal sinus tenderness. Left sinus exhibits no maxillary sinus tenderness and no frontal sinus tenderness. Mouth/Throat: Posterior oropharyngeal erythema (slight) present. Neck: Carotid bruit is not present. Cardiovascular: Normal rate, regular rhythm, normal heart sounds and intact distal pulses. Exam reveals no gallop and no friction rub. No murmur heard. Pulmonary/Chest: Effort normal. No respiratory distress. She has no wheezes. She has no rhonchi. She has no rales. Musculoskeletal: She exhibits no edema. Neurological: She is alert and oriented to person, place, and time. Skin: She is not diaphoretic. Psychiatric: She has a normal mood and affect. Her behavior is normal. Judgment and thought content normal.   Nursing note and vitals reviewed. ASSESSMENT and PLAN  Diagnoses and all orders for this visit:    1. Upper respiratory tract infection, unspecified type  -     azithromycin (ZITHROMAX) 250 mg tablet; Take 2 tablets today, then take 1 tablet daily  - Will start pt on Azithromycin 250mg and Robitussin AC for her cough. 2. Influenza        - Advised pt to continue her course of Tamiflu. 3. Cough  -     guaiFENesin-codeine (ROBITUSSIN AC) 100-10 mg/5 mL solution; Take 5 mL by mouth three (3) times daily as needed for Cough. Max Daily Amount: 15 mL. Indications: COLD SYMPTOMS, Cough  - Will start pt on Azithromycin 250mg and Robitussin AC for her cough. 4. SOB (shortness of breath)  -     XR CHEST PA LAT; Future  - Will get an XR to rule out pneumonia. Follow-up Disposition:  Return in about 1 week (around 1/27/2018), or if symptoms worsen or fail to improve, for uri. Medication risks/benefits/costs/interactions/alternatives discussed with patient. Advised patient to call back or return to office if symptoms worsen/change/persist.  If patient cannot reach us or should anything more severe/urgent arise he/she should proceed directly to the nearest emergency department. Discussed expected course/resolution/complications of diagnosis in detail with patient. Patient given a written after visit summary which includes her diagnoses, current medications and vitals.   Patient expressed understanding with the diagnosis and plan. Written by rosemarie Talbot, as dictated by Samara Lucero M.D.   I have reviewed and agree with the above note and have made corrections where appropriate, Dr. Sean Hightower MD

## 2018-01-20 NOTE — PROGRESS NOTES
Chief Complaint   Patient presents with    Cough     Family has type A Flu , she was not tested by giving Tamiflu , started yesterday. States painful chest all across, hurts to breathe and take deep breath     Did not get flu vaccine   Reviewed Record in preparation for visit and have obtained necessary documentation. Identified pt with two pt identifiers (Name @ )    Health Maintenance Due   Topic    Influenza Age 5 to Adult          1. Have you been to the ER, urgent care clinic since your last visit? Hospitalized since your last visit? Seen at [facility] yesterday. 2. Have you seen or consulted any other health care providers outside of the [address] [place] [address] since your last visit? Include any pap smears or colon screening.  No

## 2018-11-01 ENCOUNTER — OFFICE VISIT (OUTPATIENT)
Dept: FAMILY MEDICINE CLINIC | Age: 48
End: 2018-11-01

## 2018-11-01 VITALS
RESPIRATION RATE: 14 BRPM | WEIGHT: 123 LBS | BODY MASS INDEX: 24.8 KG/M2 | DIASTOLIC BLOOD PRESSURE: 77 MMHG | TEMPERATURE: 98 F | HEART RATE: 69 BPM | SYSTOLIC BLOOD PRESSURE: 119 MMHG | OXYGEN SATURATION: 100 % | HEIGHT: 59 IN

## 2018-11-01 DIAGNOSIS — R53.83 FATIGUE, UNSPECIFIED TYPE: ICD-10-CM

## 2018-11-01 DIAGNOSIS — R21 RASH: ICD-10-CM

## 2018-11-01 DIAGNOSIS — K31.84 GASTROPARESIS: ICD-10-CM

## 2018-11-01 DIAGNOSIS — R76.8 POSITIVE ANA (ANTINUCLEAR ANTIBODY): Primary | ICD-10-CM

## 2018-11-01 NOTE — PROGRESS NOTES
Chief Complaint   Patient presents with    Hives     neck started last night     1. Have you been to the ER, urgent care clinic since your last visit? Hospitalized since your last visit? No    2. Have you seen or consulted any other health care providers outside of the [address] [place] [address] since your last visit? Include any pap smears or colon screening.  No

## 2018-11-01 NOTE — PATIENT INSTRUCTIONS
Rash: Care Instructions  Your Care Instructions  A rash is any irritation or inflammation of the skin. Rashes have many possible causes, including allergy, infection, illness, heat, and emotional stress. Follow-up care is a key part of your treatment and safety. Be sure to make and go to all appointments, and call your doctor if you are having problems. It's also a good idea to know your test results and keep a list of the medicines you take. How can you care for yourself at home? · Wash the area with water only. Soap can make dryness and itching worse. Pat dry. · Put cold, wet cloths on the rash to reduce itching. · Keep cool, and stay out of the sun. · Leave the rash open to the air as much of the time as possible. · Sometimes petroleum jelly (Vaseline) can help relieve the discomfort caused by a rash. A moisturizing lotion, such as Cetaphil, also may help. Calamine lotion may help for rashes caused by contact with something (such as a plant or soap) that irritated the skin. Use it 3 or 4 times a day. · If your doctor prescribed a cream, use it as directed. If your doctor prescribed medicine, take it exactly as directed. · If your rash itches so badly that it interferes with your normal activities, take an over-the-counter antihistamine, such as diphenhydramine (Benadryl) or loratadine (Claritin). Read and follow all instructions on the label. When should you call for help? Call your doctor now or seek immediate medical care if:    · You have signs of infection, such as:  ? Increased pain, swelling, warmth, or redness. ? Red streaks leading from the area. ? Pus draining from the area. ? A fever.     · You have joint pain along with the rash.    Watch closely for changes in your health, and be sure to contact your doctor if:    · Your rash is changing or getting worse.  For example, call if you have pain along with the rash, the rash is spreading, or you have new blisters.     · You do not get better after 1 week. Where can you learn more? Go to http://leigh-pinky.info/. Enter L038 in the search box to learn more about \"Rash: Care Instructions. \"  Current as of: April 18, 2018  Content Version: 11.8  © 5895-3545 Healthwise, Incorporated. Care instructions adapted under license by [employer] (which disclaims liability or warranty for this information). If you have questions about a medical condition or this instruction, always ask your healthcare professional. [address] any warranty or liability for your use of this information.

## 2018-11-01 NOTE — PROGRESS NOTES
Assessment/Plan:     Diagnoses and all orders for this visit:    1. Positive KELLY (antinuclear antibody)  -     TRYPTASE  -     CBC WITH AUTOMATED DIFF  -     TSH 3RD GENERATION  -     THYROID ANTIBODY PANEL  -     FOOD ALLERGY PROFILE    2. Rash  -     TRYPTASE  -     CBC WITH AUTOMATED DIFF  -     TSH 3RD GENERATION  -     THYROID ANTIBODY PANEL  -     FOOD ALLERGY PROFILE  Unclear etiology. Refer to immunology given her history to rule out mastocytosis. Labs pending. Treatment otc is tolerated at this time which she will continue. 3. Fatigue, unspecified type  -     TRYPTASE  -     CBC WITH AUTOMATED DIFF  -     TSH 3RD GENERATION  -     THYROID ANTIBODY PANEL  -     FOOD ALLERGY PROFILE    4. Gastroparesis  -     TRYPTASE  -     CBC WITH AUTOMATED DIFF  -     TSH 3RD GENERATION  -     THYROID ANTIBODY PANEL  -     FOOD ALLERGY PROFILE        Follow-up Disposition:  Return if symptoms worsen or fail to improve. Discussed expected course/resolution/complications of diagnosis in detail with patient.    Medication risks/benefits/costs/interactions/alternatives discussed with patient.    Pt was given after visit summary which includes diagnoses, current medications & vitals. Pt expressed understanding with the diagnosis and plan          Subjective:      Marcin oMra is a 53 y.o. female who presents for had concerns including Hives (neck started last night). Rash  Patient complains of rash involving the neck. Rash started 1 day ago. Appearance of rash at onset: Texture of lesion(s): raised. Rash has not changed over time Initial distribution: neck. Discomfort associated with rash: no discomfort associated with rash. Associated symptoms: fatigue. Denies: fever, cough. Patient has not had previous evaluation of rash. Patient has not had previous treatment. Patient has not had contacts with similar rash. Patient has not identified precipitant.  Patient has not had new exposures (soaps, lotions, laundry detergents, foods, medications, plants, insects or animals.)    Diet Recall  B-oatmeal (prepackaged and gluten free) or 1 egg, turkey connor, english muffin  L-salad-vegetables, greens, fruit, ranch, Osiel, balsamic, chicken  D-turkey burger, tacos-turkey meat, pork tenderloin with vegetable, rice  Snacks-nuts, peanut butter  Fluids-water, coffee one cup,   Current Outpatient Medications   Medication Sig Dispense Refill    mometasone (NASONEX) 50 mcg/actuation nasal spray 2 Sprays daily.  RANITIDINE HCL (ZANTAC PO) Take  by mouth.  B.infantis-B.ani-B.long-B.bifi (PROBIOTIC 4X) 10-15 mg TbEC Take  by mouth.  ibuprofen (MOTRIN) 200 mg tablet Take  by mouth.  ondansetron hcl (ZOFRAN) 4 mg tablet Take 4 mg by mouth every eight (8) hours as needed for Nausea.  azelaic acid (FINACEA) 15 % topical gel Apply  to affected area two (2) times a day.  oseltamivir (TAMIFLU) 75 mg capsule       guaiFENesin-codeine (ROBITUSSIN AC) 100-10 mg/5 mL solution Take 5 mL by mouth three (3) times daily as needed for Cough. Max Daily Amount: 15 mL. Indications: COLD SYMPTOMS, Cough 120 mL 0    fexofenadine (ALLEGRA) 180 mg tablet Take 1 Tab by mouth daily. Indications: SEASONAL ALLERGIC RHINITIS 30 Tab 3       Allergies   Allergen Reactions    Augmentin [Amoxicillin-Pot Clavulanate] Rash       ROS:   Review of Systems   Constitutional: Positive for malaise/fatigue. Eyes: Negative for blurred vision. Respiratory: Negative for shortness of breath. Cardiovascular: Negative for chest pain. Gastrointestinal: Positive for nausea. Musculoskeletal: Positive for myalgias. Skin: Positive for rash. Objective:     Visit Vitals  /77 (BP 1 Location: Left arm, BP Patient Position: Sitting)   Pulse 69   Temp 98 °F (36.7 °C) (Oral)   Resp 14   Ht 4' 11\" (1.499 m)   Wt 123 lb (55.8 kg)   LMP 11/01/2018 (Exact Date)   SpO2 100%   BMI 24.84 kg/m²       Vitals and Nurse Documentation reviewed. Physical Exam   Constitutional: No distress. HENT:   Right Ear: Tympanic membrane is not erythematous and not bulging. No middle ear effusion. Left Ear: Tympanic membrane is not erythematous and not bulging. No middle ear effusion. Nose: No rhinorrhea. Right sinus exhibits no maxillary sinus tenderness and no frontal sinus tenderness. Left sinus exhibits no maxillary sinus tenderness and no frontal sinus tenderness. Mouth/Throat: No oropharyngeal exudate or posterior oropharyngeal erythema. Eyes: EOM and lids are normal.   Cardiovascular: S1 normal and S2 normal. Exam reveals no gallop and no friction rub. No murmur heard. Pulmonary/Chest: Breath sounds normal. She has no wheezes. Lymphadenopathy:     She has no cervical adenopathy. Skin: Skin is warm and dry. Rash (pinpoint maculopapular erythematous rash to the chest and mild on the bilateral cheeks) noted.    Psychiatric: Mood and affect normal.

## 2018-11-04 LAB
BASOPHILS # BLD AUTO: 0 X10E3/UL (ref 0–0.2)
BASOPHILS NFR BLD AUTO: 1 %
CLAM IGE QN: <0.1 KU/L
CODFISH IGE QN: <0.1 KU/L
CORN IGE QN: <0.1 KU/L
COW MILK IGE QN: <0.1 KU/L
EGG WHITE IGE QN: <0.1 KU/L
EOSINOPHIL # BLD AUTO: 0.1 X10E3/UL (ref 0–0.4)
EOSINOPHIL NFR BLD AUTO: 1 %
ERYTHROCYTE [DISTWIDTH] IN BLOOD BY AUTOMATED COUNT: 14 % (ref 12.3–15.4)
HCT VFR BLD AUTO: 40.9 % (ref 34–46.6)
HGB BLD-MCNC: 13.4 G/DL (ref 11.1–15.9)
IMM GRANULOCYTES # BLD: 0 X10E3/UL (ref 0–0.1)
IMM GRANULOCYTES NFR BLD: 0 %
LYMPHOCYTES # BLD AUTO: 1.9 X10E3/UL (ref 0.7–3.1)
LYMPHOCYTES NFR BLD AUTO: 36 %
Lab: NORMAL
MCH RBC QN AUTO: 29.3 PG (ref 26.6–33)
MCHC RBC AUTO-ENTMCNC: 32.8 G/DL (ref 31.5–35.7)
MCV RBC AUTO: 90 FL (ref 79–97)
MONOCYTES # BLD AUTO: 0.3 X10E3/UL (ref 0.1–0.9)
MONOCYTES NFR BLD AUTO: 6 %
NEUTROPHILS # BLD AUTO: 3 X10E3/UL (ref 1.4–7)
NEUTROPHILS NFR BLD AUTO: 56 %
PEANUT IGE QN: <0.1 KU/L
PLATELET # BLD AUTO: 272 X10E3/UL (ref 150–379)
RBC # BLD AUTO: 4.57 X10E6/UL (ref 3.77–5.28)
SCALLOP IGE QN: <0.1 KU/L
SESAME SEED IGE QN: <0.1 KU/L
SHRIMP IGE QN: <0.1 KU/L
SOYBEAN IGE QN: <0.1 KU/L
THYROGLOB AB SERPL-ACNC: <1 IU/ML (ref 0–0.9)
THYROPEROXIDASE AB SERPL-ACNC: 14 IU/ML (ref 0–34)
TRYPTASE SERPL-MCNC: 4.9 UG/L (ref 2.2–13.2)
TSH SERPL DL<=0.005 MIU/L-ACNC: 1.34 UIU/ML (ref 0.45–4.5)
WALNUT IGE QN: <0.1 KU/L
WBC # BLD AUTO: 5.4 X10E3/UL (ref 3.4–10.8)
WHEAT IGE QN: <0.1 KU/L

## 2018-11-05 DIAGNOSIS — R76.8 POSITIVE ANA (ANTINUCLEAR ANTIBODY): Primary | ICD-10-CM

## 2018-11-05 DIAGNOSIS — M79.10 MYALGIA: ICD-10-CM

## 2018-11-05 DIAGNOSIS — L30.9 DERMATITIS: ICD-10-CM

## 2019-01-22 ENCOUNTER — OFFICE VISIT (OUTPATIENT)
Dept: FAMILY MEDICINE CLINIC | Age: 49
End: 2019-01-22

## 2019-01-22 VITALS
DIASTOLIC BLOOD PRESSURE: 74 MMHG | HEART RATE: 66 BPM | SYSTOLIC BLOOD PRESSURE: 111 MMHG | BODY MASS INDEX: 25.12 KG/M2 | RESPIRATION RATE: 16 BRPM | TEMPERATURE: 95.2 F | OXYGEN SATURATION: 100 % | WEIGHT: 124.6 LBS | HEIGHT: 59 IN

## 2019-01-22 DIAGNOSIS — J06.9 VIRAL UPPER RESPIRATORY TRACT INFECTION: Primary | ICD-10-CM

## 2019-01-22 LAB
S PYO AG THROAT QL: NEGATIVE
VALID INTERNAL CONTROL?: YES

## 2019-01-22 RX ORDER — LORATADINE 10 MG/1
10 TABLET ORAL DAILY
COMMUNITY

## 2019-01-22 NOTE — PROGRESS NOTES
Chief Complaint   Patient presents with    Headache    Ear Pain    Nasal Congestion     onset x one week        1. Have you been to the ER, urgent care clinic since your last visit? Hospitalized since your last visit? No    2. Have you seen or consulted any other health care providers outside of the [address] [place] [address] since your last visit? Include any pap smears or colon screening.  No

## 2019-01-22 NOTE — PATIENT INSTRUCTIONS
For your symptoms: Your symptoms may improve with an oral antihistamine. These are available over the counter and include:  Loratadine/claritin  Cetirizine/Zyrtec  Fexofenadine/Allegra  Levocetirizine/Xyzal    · Your symptoms may improve with a nasal steroid. These are available over the counter and include:  · Flonase (aka fluticasone)  · Nasocort (aka triamcinolone)  · Nasonex (aka mometasone)  · Rhinocort (aka budesonide)    · Increase fluid intake, especially water to thin mucous and boost the immune system. · Avoid sugar and dairy while congested since they thicken mucous. · Get plenty of rest!    · Gargle 3 times daily and as needed in Listerine or warm salt water vinegar solutions (1 tsp salt, 1 tsp vinegar in 1 cup lukewarm water.)    · Use OTC nasal saline spray up each nostril four times daily. You could also consider using a netipot with distilled water. · Use humidifier at bedtime. · Use OTC Mucinex 600 mg twice daily to loosen mucous. · Use OTC Tylenol  (up to 650mg every 6 hours) or Ibuprofen (up to 800 mg every 8 hours) as needed for pain, fever or headaches. ·  Avoid decongestants and Ibuprofen if you have high blood pressure! Return to the doctor for evaluation:  · If mucous is consistently discolored yellow or green throughout the day for more than a week  · If you develop worsening facial pain  · If you develop a fever that will not go away  · If your symptoms worsen instead of improve           Saline Nasal Washes: Care Instructions  Your Care Instructions  Saline nasal washes help keep the nasal passages open by washing out thick or dried mucus. This simple remedy can help relieve symptoms of allergies, sinusitis, and colds. It also can make the nose feel more comfortable by keeping the mucous membranes moist. You may notice a little burning sensation in your nose the first few times you use the solution, but this usually gets better in a few days.   Follow-up care is a key part of your treatment and safety. Be sure to make and go to all appointments, and call your doctor if you are having problems. It's also a good idea to know your test results and keep a list of the medicines you take. How can you care for yourself at home? · You can buy premixed saline solution in a squeeze bottle or other sinus rinse products at a drugstore. Read and follow the instructions on the label. · You also can make your own saline solution by adding 1 teaspoon of salt and 1 teaspoon of baking soda to 2 cups of distilled water. · If you use a homemade solution, pour a small amount into a clean bowl. Using a rubber bulb syringe, squeeze the syringe and place the tip in the salt water. Pull a small amount of the salt water into the syringe by relaxing your hand. · Sit down with your head tilted slightly back. Do not lie down. Put the tip of the bulb syringe or the squeeze bottle a little way into one of your nostrils. Gently drip or squirt a few drops into the nostril. Repeat with the other nostril. Some sneezing and gagging are normal at first.  · Gently blow your nose. · Wipe the syringe or bottle tip clean after each use. · Repeat this 2 or 3 times a day. · Use nasal washes gently if you have nosebleeds often. When should you call for help? Watch closely for changes in your health, and be sure to contact your doctor if:    · You often get nosebleeds.     · You have problems doing the nasal washes. Where can you learn more? Go to http://leigh-pinky.info/. Enter 071 [id number] [id number] in the search box to learn more about \"Saline Nasal Washes: Care Instructions. \"  Current as of: March 27, 2018  Content Version: 11.9  © 6060-3377 [employer]. Care instructions adapted under license by [employer] (which disclaims liability or warranty for this information).  If you have questions about a medical condition or this instruction, always ask your healthcare professional. Healthwise, [facility] disclaims any warranty or liability for your use of this information. Upper Respiratory Infection (Cold): Care Instructions  Your Care Instructions    An upper respiratory infection, or URI, is an infection of the nose, sinuses, or throat. URIs are spread by coughs, sneezes, and direct contact. The common cold is the most frequent kind of URI. The flu and sinus infections are other kinds of URIs. Almost all URIs are caused by viruses. Antibiotics won't cure them. But you can treat most infections with home care. This may include drinking lots of fluids and taking over-the-counter pain medicine. You will probably feel better in 4 to 10 days. The doctor has checked you carefully, but problems can develop later. If you notice any problems or new symptoms, get medical treatment right away. Follow-up care is a key part of your treatment and safety. Be sure to make and go to all appointments, and call your doctor if you are having problems. It's also a good idea to know your test results and keep a list of the medicines you take. How can you care for yourself at home? · To prevent dehydration, drink plenty of fluids, enough so that your urine is light yellow or clear like water. Choose water and other caffeine-free clear liquids until you feel better. If you have kidney, heart, or liver disease and have to limit fluids, talk with your doctor before you increase the amount of fluids you drink. · Take an over-the-counter pain medicine, such as acetaminophen (Tylenol), ibuprofen (Advil, Motrin), or naproxen (Aleve). Read and follow all instructions on the label. · Before you use cough and cold medicines, check the label. These medicines may not be safe for young children or for people with certain health problems. · Be careful when taking over-the-counter cold or flu medicines and Tylenol at the same time. Many of these medicines have acetaminophen, which is Tylenol.  Read the labels to make sure that you are not taking more than the recommended dose. Too much acetaminophen (Tylenol) can be harmful. · Get plenty of rest.  · Do not smoke or allow others to smoke around you. If you need help quitting, talk to your doctor about stop-smoking programs and medicines. These can increase your chances of quitting for good. When should you call for help? Call 911 anytime you think you may need emergency care. For example, call if:    · You have severe trouble breathing.    Call your doctor now or seek immediate medical care if:    · You seem to be getting much sicker.     · You have new or worse trouble breathing.     · You have a new or higher fever.     · You have a new rash.    Watch closely for changes in your health, and be sure to contact your doctor if:    · You have a new symptom, such as a sore throat, an earache, or sinus pain.     · You cough more deeply or more often, especially if you notice more mucus or a change in the color of your mucus.     · You do not get better as expected. Where can you learn more? Go to http://leigh-pinky.info/. Enter G115 in the search box to learn more about \"Upper Respiratory Infection (Cold): Care Instructions. \"  Current as of: September 5, 2018  Content Version: 11.9  © 6172-8366 [employer]. Care instructions adapted under license by [employer] (which disclaims liability or warranty for this information). If you have questions about a medical condition or this instruction, always ask your healthcare professional. [place] [address] any warranty or liability for your use of this information.

## 2019-03-27 NOTE — PATIENT INSTRUCTIONS
Sore Throat: Care Instructions  Your Care Instructions    Infection by bacteria or a virus causes most sore throats. Cigarette smoke, dry air, air pollution, allergies, and yelling can also cause a sore throat. Sore throats can be painful and annoying. Fortunately, most sore throats go away on their own. If you have a bacterial infection, your doctor may prescribe antibiotics. Follow-up care is a key part of your treatment and safety. Be sure to make and go to all appointments, and call your doctor if you are having problems. It's also a good idea to know your test results and keep a list of the medicines you take. How can you care for yourself at home? · If your doctor prescribed antibiotics, take them as directed. Do not stop taking them just because you feel better. You need to take the full course of antibiotics. · Gargle with warm salt water once an hour to help reduce swelling and relieve discomfort. Use 1 teaspoon of salt mixed in 1 cup of warm water. · Take an over-the-counter pain medicine, such as acetaminophen (Tylenol), ibuprofen (Advil, Motrin), or naproxen (Aleve). Read and follow all instructions on the label. · Be careful when taking over-the-counter cold or flu medicines and Tylenol at the same time. Many of these medicines have acetaminophen, which is Tylenol. Read the labels to make sure that you are not taking more than the recommended dose. Too much acetaminophen (Tylenol) can be harmful. · Drink plenty of fluids. Fluids may help soothe an irritated throat. Hot fluids, such as tea or soup, may help decrease throat pain. · Use over-the-counter throat lozenges to soothe pain. Regular cough drops or hard candy may also help. These should not be given to young children because of the risk of choking. · Do not smoke or allow others to smoke around you. If you need help quitting, talk to your doctor about stop-smoking programs and medicines.  These can increase your chances of quitting for good. · Use a vaporizer or humidifier to add moisture to your bedroom. Follow the directions for cleaning the machine. When should you call for help? Call your doctor now or seek immediate medical care if:  · You have new or worse trouble swallowing. · Your sore throat gets much worse on one side. Watch closely for changes in your health, and be sure to contact your doctor if you do not get better as expected. Where can you learn more? Go to http://leigh-pinky.info/. Enter [phone] [id number] [phone] in the search box to learn more about \"Sore Throat: Care Instructions. \"  Current as of: July 29, 2016  Content Version: 11.1  © 9129-5398 [employer]. Care instructions adapted under license by [employer] (which disclaims liability or warranty for this information). If you have questions about a medical condition or this instruction, always ask your healthcare professional. [address] any warranty or liability for your use of this information. Mastoid Interpolation Flap Text: A decision was made to reconstruct the defect utilizing an interpolation axial flap and a staged reconstruction.  A telfa template was made of the defect.  This telfa template was then used to outline the mastoid interpolation flap.  The donor area for the pedicle flap was then injected with anesthesia.  The flap was excised through the skin and subcutaneous tissue down to the layer of the underlying musculature.  The pedicle flap was carefully excised within this deep plane to maintain its blood supply.  The edges of the donor site were undermined.   The donor site was closed in a primary fashion.  The pedicle was then rotated into position and sutured.  Once the tube was sutured into place, adequate blood supply was confirmed with blanching and refill.  The pedicle was then wrapped with xeroform gauze and dressed appropriately with a telfa and gauze bandage to ensure continued blood supply and protect the attached pedicle.

## 2020-02-19 ENCOUNTER — OFFICE VISIT (OUTPATIENT)
Dept: FAMILY MEDICINE CLINIC | Age: 50
End: 2020-02-19

## 2020-02-19 VITALS
BODY MASS INDEX: 24.31 KG/M2 | TEMPERATURE: 98.2 F | DIASTOLIC BLOOD PRESSURE: 70 MMHG | OXYGEN SATURATION: 98 % | HEART RATE: 65 BPM | SYSTOLIC BLOOD PRESSURE: 113 MMHG | HEIGHT: 59 IN | WEIGHT: 120.6 LBS

## 2020-02-19 DIAGNOSIS — J32.9 RHINOSINUSITIS: Primary | ICD-10-CM

## 2020-02-19 DIAGNOSIS — R51.9 NONINTRACTABLE HEADACHE, UNSPECIFIED CHRONICITY PATTERN, UNSPECIFIED HEADACHE TYPE: ICD-10-CM

## 2020-02-19 DIAGNOSIS — J34.89 SINUS PRESSURE: ICD-10-CM

## 2020-02-19 DIAGNOSIS — J31.0 RHINOSINUSITIS: Primary | ICD-10-CM

## 2020-02-19 RX ORDER — AZITHROMYCIN 250 MG/1
TABLET, FILM COATED ORAL
Qty: 6 TAB | Refills: 0 | Status: SHIPPED | OUTPATIENT
Start: 2020-02-19

## 2020-02-19 RX ORDER — DICLOFENAC SODIUM 25 MG/1
25 TABLET, DELAYED RELEASE ORAL
Qty: 60 TAB | Refills: 0 | Status: SHIPPED | OUTPATIENT
Start: 2020-02-19

## 2020-02-20 NOTE — PROGRESS NOTES
[facility] Note      Subjective:     Chief Complaint   Patient presents with    Headache     Patient was Dx: with flu 2020 and was treated at [facility] minute clinic was feeling better then apox 1 days she has had a headache all day and it is getting worse. Tryied over the counter med did not work. Litzy Lott is a 51y.o. year old female who presents for evaluation of the following:      Headache:   Onset this morning  Character: pressure top of head and maxillary sinuses bilaterally  - associated muscle aches and chills, fatigue  Duration: hours  Current Pain Ratin/10  Treatment: ibuprofen 400mg every 4 hours  Endorses: History of migraine with similar symptoms and visual aura  Denies fever, vomiting      Review of Systems   Pertinent positives and negative per HPI. All other systems  reviewed are negative for a Comprehensive ROS (10+).        Past Medical History:   Diagnosis Date    Gastroparesis     Ovarian cyst, left     Shortness of breath     Normal Spirometry and Pulmonary Evaluation        Social History     Socioeconomic History    Marital status:      Spouse name: Not on file    Number of children: Not on file    Years of education: Not on file    Highest education level: Not on file   Occupational History    Not on file   Social Needs    Financial resource strain: Not on file    Food insecurity:     Worry: Not on file     Inability: Not on file    Transportation needs:     Medical: Not on file     Non-medical: Not on file   Tobacco Use    Smoking status: Never Smoker    Smokeless tobacco: Never Used   Substance and Sexual Activity    Alcohol use: No     Alcohol/week: 0.0 standard drinks    Drug use: No    Sexual activity: Yes     Partners: Male     Birth control/protection: None     Comment: vasectomy   Lifestyle    Physical activity:     Days per week: Not on file     Minutes per session: Not on file    Stress: Not on file Relationships    Social connections:     Talks on phone: Not on file     Gets together: Not on file     Attends [employer] service: Not on file     Active member of club or organization: Not on file     Attends meetings of clubs or organizations: Not on file     Relationship status: Not on file    Intimate partner violence:     Fear of current or ex partner: Not on file     Emotionally abused: Not on file     Physically abused: Not on file     Forced sexual activity: Not on file   Other Topics Concern    Not on file   Social History Narrative    Not on file       Family History   Problem Relation Age of Onset    Hypertension Father     Arthritis-osteo Father     Heart Disease Maternal Grandfather        Current Outpatient Medications   Medication Sig    mometasone (NASONEX) 50 mcg/actuation nasal spray 2 Sprays daily.  fexofenadine (ALLEGRA) 180 mg tablet Take 1 Tab by mouth daily. Indications: SEASONAL ALLERGIC RHINITIS    B.infantis-B.ani-B.long-B.bifi (PROBIOTIC 4X) 10-15 mg TbEC Take  by mouth.  ibuprofen (MOTRIN) 200 mg tablet Take  by mouth.  azelaic acid (FINACEA) 15 % topical gel Apply  to affected area two (2) times a day.  loratadine (CLARITIN) 10 mg tablet Take 10 mg by mouth daily.  oseltamivir (TAMIFLU) 75 mg capsule     guaiFENesin-codeine (ROBITUSSIN AC) 100-10 mg/5 mL solution Take 5 mL by mouth three (3) times daily as needed for Cough. Max Daily Amount: 15 mL. Indications: COLD SYMPTOMS, Cough    RANITIDINE HCL (ZANTAC PO) Take  by mouth.  ondansetron hcl (ZOFRAN) 4 mg tablet Take 4 mg by mouth every eight (8) hours as needed for Nausea. No current facility-administered medications for this visit.               Objective:     Vitals:    02/19/20 1908   BP: 113/70   Pulse: 65   Temp: 98.2 °F (36.8 °C)   TempSrc: Oral   SpO2: 98%   Weight: 120 lb 9.6 oz (54.7 kg)   Height: 4' 11\" (1.499 m)       Physical Examination:  General: Alert, cooperative, no distress, appears stated age. Eyes: Conjunctivae clear. Pupils equally round and reactive to light, Extraocular muscles intact. Ears: Normal external ear canals both ears. Tympanic membranes clear and mobile bilaterally   Nose: Nares normal. Septum midline. Mucosa normal.  Bilateral maxillary sinus tenderness. Mouth/Throat: Lips, mucosa, and tongue normal. No oropharyngeal erythema. No tonsillar enlargement or exudate. Neck: Supple, symmetrical, trachea midline, no adenopathy. No thyroid enlargement/tenderness/nodules  Respiratory: Breathing comfortably, in no acute respiratory distress. Clear to auscultation bilaterally. Normal inspiratory and expiratory ratio. Cardiovascular: Regular rate and rhythm, S1, S2 normal, no murmur, click, rub or gallop. Abdomen: Soft, non-tender, not distended. Bowel sounds normal.  MSK: Extremities normal appearing, atraumatic, no effusion. Gait steady and unassisted. Back symmetric, no curvature. Range of motion normal. No Costovertebral angle tenderness. Skin: Skin color, texture, turgor normal. No rashes or lesions on exposed skin. Lymph nodes: Cervical, supraclavicular nodes normal.  Neurologic: Cranial nerves II-XII intact. Psychiatric: Affect appropriate      No visits with results within 3 Month(s) from this visit. Latest known visit with results is:   Office Visit on 01/22/2019   Component Date Value Ref Range Status    VALID INTERNAL CONTROL POC 01/22/2019 Yes   Final    Group A Strep Ag 01/22/2019 Negative  Negative Final           Assessment/ Plan:   Diagnoses and all orders for this visit:    1. Rhinosinusitis  -     azithromycin (ZITHROMAX) 250 mg tablet; Take 2 tablets today, then take 1 tablet daily    2. Sinus pressure  -     azithromycin (ZITHROMAX) 250 mg tablet; Take 2 tablets today, then take 1 tablet daily    3. Nonintractable headache, unspecified chronicity pattern, unspecified headache type  -     diclofenac EC (VOLTAREN) 25 mg EC tablet;  Take 1 Tab by mouth two (2) times daily as needed for Pain. Headache is likely flare of migraine vs developing viral syndrome. Offered ketorolac to treat which patient declined. Trial diclofenac p.o. instead. Mild sinusitis developing. No SIRS. Trial of otc meds for symptom relief discussed and listed in patient instructions- nasal steroid + mucinex + antihistamine + sinus rinse + otc analgesia + humidifier prn  -Delayed prescription provided for antibiotic to treat prolonged symptoms concerning for bacterial infection. Educated patient on red flag symptoms to warrant return to clinic or emergency room visit. I have discussed the diagnosis with the patient and the intended plan as seen in the above orders. The patient has been offered or received an after-visit summary and questions were answered concerning future plans. I have discussed medication side effects and warnings with the patient as well. Follow-up and Dispositions    · Return if symptoms worsen or fail to improve.          Signed,    Roshan Garland MD  2/19/2020

## 2020-02-20 NOTE — PROGRESS NOTES
Chief Complaint   Patient presents with    Headache     Patient was Dx: with flu 2/11/2020 and was treated at [facility] clinic was feeling better then apox 1 days she has had a headache all day and it is getting worse. Tryied over the counter med did not work. 1. Have you been to the ER, urgent care clinic since your last visit? Hospitalized since your last visit? Yes [facility]    2. Have you seen or consulted any other health care providers outside of the [address] [place] [address] since your last visit? Include any pap smears or colon screening.  No

## 2020-02-20 NOTE — PATIENT INSTRUCTIONS
For your symptoms: Your symptoms may improve with an oral antihistamine. These are available over the counter and include:  Loratadine/claritin  Cetirizine/Zyrtec  Fexofenadine/Allegra  Levocetirizine/Xyzal    · Your symptoms may improve with a nasal steroid. These are available over the counter and include:  · Flonase (aka fluticasone)  · Nasocort (aka triamcinolone)  · Nasonex (aka mometasone)  · Rhinocort (aka budesonide)    · Increase fluid intake, especially water to thin mucous and boost the immune system. · Avoid sugar and dairy while congested since they thicken mucous. · Get plenty of rest!    · Gargle 3 times daily and as needed in Listerine or warm salt water vinegar solutions (1 tsp salt, 1 tsp vinegar in 1 cup lukewarm water.)    · Use OTC nasal saline spray up each nostril four times daily. You could also consider using a netipot with distilled water. · Use humidifier at bedtime. · Use OTC Mucinex 600 mg twice daily to loosen mucous. · Use OTC Tylenol  (up to 650mg every 6 hours) or Ibuprofen (up to 800 mg every 8 hours) as needed for pain, fever or headaches. ·  Avoid decongestants and Ibuprofen if you have high blood pressure! Return to the doctor for evaluation:  · If mucous is consistently discolored yellow or green throughout the day for more than a week  · If you develop worsening facial pain  · If you develop a fever that will not go away  · If your symptoms worsen instead of improve           Saline Nasal Washes: Care Instructions  Your Care Instructions  Saline nasal washes help keep the nasal passages open by washing out thick or dried mucus. This simple remedy can help relieve symptoms of allergies, sinusitis, and colds. It also can make the nose feel more comfortable by keeping the mucous membranes moist. You may notice a little burning sensation in your nose the first few times you use the solution, but this usually gets better in a few days.   Follow-up care is a key part of your treatment and safety. Be sure to make and go to all appointments, and call your doctor if you are having problems. It's also a good idea to know your test results and keep a list of the medicines you take. How can you care for yourself at home? · You can buy premixed saline solution in a squeeze bottle or other sinus rinse products at a drugstore. Read and follow the instructions on the label. · You also can make your own saline solution by adding 1 teaspoon of salt and 1 teaspoon of baking soda to 2 cups of distilled water. · If you use a homemade solution, pour a small amount into a clean bowl. Using a rubber bulb syringe, squeeze the syringe and place the tip in the salt water. Pull a small amount of the salt water into the syringe by relaxing your hand. · Sit down with your head tilted slightly back. Do not lie down. Put the tip of the bulb syringe or the squeeze bottle a little way into one of your nostrils. Gently drip or squirt a few drops into the nostril. Repeat with the other nostril. Some sneezing and gagging are normal at first.  · Gently blow your nose. · Wipe the syringe or bottle tip clean after each use. · Repeat this 2 or 3 times a day. · Use nasal washes gently if you have nosebleeds often. When should you call for help? Watch closely for changes in your health, and be sure to contact your doctor if:    · You often get nosebleeds.     · You have problems doing the nasal washes. Where can you learn more? Go to http://leigh-pinky.info/. Enter [id number] [id number] in the search box to learn more about \"Saline Nasal Washes: Care Instructions. \"  Current as of: October 21, 2018  Content Version: 12.2  © 4966-7574 [employer]. Care instructions adapted under license by [employer] (which disclaims liability or warranty for this information).  If you have questions about a medical condition or this instruction, always ask your healthcare melissa. [address] any warranty or liability for your use of this information. Sinusitis: Care Instructions  Your Care Instructions    Sinusitis is an infection of the lining of the sinus cavities in your head. Sinusitis often follows a cold. It causes pain and pressure in your head and face. In most cases, sinusitis gets better on its own in 1 to 2 weeks. But some mild symptoms may last for several weeks. Sometimes antibiotics are needed. Follow-up care is a key part of your treatment and safety. Be sure to make and go to all appointments, and call your doctor if you are having problems. It's also a good idea to know your test results and keep a list of the medicines you take. How can you care for yourself at home? · Take an over-the-counter pain medicine, such as acetaminophen (Tylenol), ibuprofen (Advil, Motrin), or naproxen (Aleve). Read and follow all instructions on the label. · If the doctor prescribed antibiotics, take them as directed. Do not stop taking them just because you feel better. You need to take the full course of antibiotics. · Be careful when taking over-the-counter cold or flu medicines and Tylenol at the same time. Many of these medicines have acetaminophen, which is Tylenol. Read the labels to make sure that you are not taking more than the recommended dose. Too much acetaminophen (Tylenol) can be harmful. · Breathe warm, moist air from a steamy shower, a hot bath, or a sink filled with hot water. Avoid cold, dry air. Using a humidifier in your home may help. Follow the directions for cleaning the machine. · Use saline (saltwater) nasal washes to help keep your nasal passages open and wash out mucus and bacteria. You can buy saline nose drops at a grocery store or drugstore. Or you can make your own at home by adding 1 teaspoon of salt and 1 teaspoon of baking soda to 2 cups of distilled water.  If you make your own, fill a bulb syringe with the solution, insert the tip into your nostril, and squeeze gently. [place] your nose. · Put a hot, wet towel or a warm gel pack on your face 3 or 4 times a day for 5 to 10 minutes each time. · Try a decongestant nasal spray like oxymetazoline (Afrin). Do not use it for more than 3 days in a row. Using it for more than 3 days can make your congestion worse. When should you call for help? Call your doctor now or seek immediate medical care if:    · You have new or worse swelling or redness in your face or around your eyes.     · You have a new or higher fever.    Watch closely for changes in your health, and be sure to contact your doctor if:    · You have new or worse facial pain.     · The mucus from your nose becomes thicker (like pus) or has new blood in it.     · You are not getting better as expected. Where can you learn more? Go to http://leigh-pinky.info/. Enter A716 in the search box to learn more about \"Sinusitis: Care Instructions. \"  Current as of: October 21, 2018  Content Version: 12.2  © 5506-3763 [employer]. Care instructions adapted under license by [employer] (which disclaims liability or warranty for this information). If you have questions about a medical condition or this instruction, always ask your healthcare professional. [address] any warranty or liability for your use of this information.

## 2020-05-16 ENCOUNTER — NURSE TRIAGE (OUTPATIENT)
Dept: OTHER | Facility: CLINIC | Age: 50
End: 2020-05-16

## 2020-05-16 NOTE — TELEPHONE ENCOUNTER
Reason for Disposition   General information question, no triage required and triager able to answer question    Protocols used: INFORMATION ONLY CALL-ADULT-AH    Caller had questions d/t she was just notified she tested positive for COVID antibodies. Told by a nurse friend that the [facility] should call her for further testing. Given the number to [facility] in [facility]. Declined triage when offered.

## 2020-05-18 ENCOUNTER — TELEPHONE (OUTPATIENT)
Dept: FAMILY MEDICINE CLINIC | Age: 50
End: 2020-05-18

## 2020-05-18 NOTE — TELEPHONE ENCOUNTER
Returned call to patient. She reports headache and fatigue and other constellation of symptoms since early this year more than 3 month. She has recently had antibody testing at an outside facility. IgM positive, IM \"low\" percentage. States all symptoms are not improved. GI symptoms improved but not resolved. GI symptoms are chronic, since before other symptom onset. Patient asks about interpretation of antibody testing and asks if she is a candidate for given blood to help others. Asks if she needs to repeat antibody testing. Requested pain send report for review via Lela or mail. Advised patient she was not a candidate based unknown information. Advised patient continue symptom monitoring to guide further testing. Offered virtual visit to discuss GI symptoms, which patient declined.

## 2020-05-18 NOTE — TELEPHONE ENCOUNTER
Patient is calling stating that's she got her antibody test done at [facility]. She came back positive for the it. She want a call back from her PCP or any nurse practitioner, NOT the nurse, to talk about it.   BCB# [phone]

## 2022-03-19 PROBLEM — J30.9 ALLERGIC RHINITIS: Status: ACTIVE | Noted: 2017-04-03

## 2022-10-26 NOTE — PROGRESS NOTES
[facility] Note      Subjective:     Chief Complaint   Patient presents with    Headache    Ear Pain    Nasal Congestion     onset x one week      Chandni Cherry is a 53y.o. year old female who presents for evaluation of the following:    Cold Symptoms  Onset 1 week ago  Nasal congestion, frontal headache intermittent, left side ear pain  Scratchy throat, irritation around lips. Sone with strep throat:   Fees more tired easily  Tx: Advil with relief  Denies chest pain,  Shortness, vomiting, fever, rash      Review of Systems   Pertinent positives and negative per HPI. All other systems  reviewed are negative for a Comprehensive ROS (10+). Past Medical History:   Diagnosis Date    Gastroparesis     Ovarian cyst, left     Shortness of breath     Normal Spirometry and Pulmonary Evaluation        Social History     Socioeconomic History    Marital status:      Spouse name: Not on file    Number of children: Not on file    Years of education: Not on file    Highest education level: Not on file   Social Needs    Financial resource strain: Not on file    Food insecurity - worry: Not on file    Food insecurity - inability: Not on file    Transportation needs - medical: Not on file   [place] needs - non-medical: Not on file   Occupational History    Not on file   Tobacco Use    Smoking status: Never Smoker    Smokeless tobacco: Never Used   Substance and Sexual Activity    Alcohol use: No     Alcohol/week: 0.0 oz    Drug use: No    Sexual activity: Yes     Partners: Male     Birth control/protection: None     Comment: vasectomy   Other Topics Concern    Not on file   Social History Narrative    Not on file       Current Outpatient Medications   Medication Sig    loratadine (CLARITIN) 10 mg tablet Take 10 mg by mouth daily.  mometasone (NASONEX) 50 mcg/actuation nasal spray 2 Sprays daily.  RANITIDINE HCL (ZANTAC PO) Take  by mouth. Patient notified of recent lab results during office visit with PCP and has verbalized understanding.  No further questions or concerns.     B.infantis-B.ani-B.long-B.bifi (PROBIOTIC 4X) 10-15 mg TbEC Take  by mouth.  ibuprofen (MOTRIN) 200 mg tablet Take  by mouth.  ondansetron hcl (ZOFRAN) 4 mg tablet Take 4 mg by mouth every eight (8) hours as needed for Nausea.  azelaic acid (FINACEA) 15 % topical gel Apply  to affected area two (2) times a day.  oseltamivir (TAMIFLU) 75 mg capsule     guaiFENesin-codeine (ROBITUSSIN AC) 100-10 mg/5 mL solution Take 5 mL by mouth three (3) times daily as needed for Cough. Max Daily Amount: 15 mL. Indications: COLD SYMPTOMS, Cough    fexofenadine (ALLEGRA) 180 mg tablet Take 1 Tab by mouth daily. Indications: SEASONAL ALLERGIC RHINITIS     No current facility-administered medications for this visit. Objective:     Vitals:    01/22/19 1138 01/22/19 1144   BP:  111/74   Pulse:  66   Resp:  16   Temp:  95.2 °F (35.1 °C)   TempSrc:  Oral   SpO2:  100%   Weight: 124 lb 9.6 oz (56.5 kg)    Height: 4' 11\" (1.499 m)        Physical Examination:  General: Alert, cooperative, no distress, appears stated age. Eyes: Conjunctivae clear. PERRL, EOMs intact. Ears: Normal external ear canals both ears. TM clear and NOT mobile bilaterally   Nose: Nares normal. Septum midline. Mucosa normal. No drainage or sinus tenderness. Mouth/Throat: Lips, mucosa, and tongue normal.   Neck: Supple, symmetrical, trachea midline, no adenopathy. No thyroid enlargement/tenderness/nodules  Lungs: Clear to auscultation bilaterally. Normal inspiratory and expiratory ratio. Heart: Regular rate and rhythm, S1, S2 normal, no murmur, click, rub or gallop. Extremities: Extremities normal, atraumatic, no cyanosis or edema. Skin: Skin color, texture, turgor normal. No rashes or lesions on exposed skin.   Lymph nodes: Cervical, supraclavicular nodes normal.  Neurologic: CNII-XII intact    Office Visit on 11/01/2018   Component Date Value Ref Range Status    TRYPTASE 11/01/2018 4.9  2.2 - 13.2 ug/L Final    WBC 11/01/2018 5.4 3.4 - 10.8 x10E3/uL Final    RBC 11/01/2018 4.57  3.77 - 5.28 x10E6/uL Final    HGB 11/01/2018 13.4  11.1 - 15.9 g/dL Final    HCT 11/01/2018 40.9  34.0 - 46.6 % Final    MCV 11/01/2018 90  79 - 97 fL Final    MCH 11/01/2018 29.3  26.6 - 33.0 pg Final    MCHC 11/01/2018 32.8  31.5 - 35.7 g/dL Final    RDW 11/01/2018 14.0  12.3 - 15.4 % Final    PLATELET 91/65/8879 637  150 - 379 x10E3/uL Final    NEUTROPHILS 11/01/2018 56  Not Estab. % Final    Lymphocytes 11/01/2018 36  Not Estab. % Final    MONOCYTES 11/01/2018 6  Not Estab. % Final    EOSINOPHILS 11/01/2018 1  Not Estab. % Final    BASOPHILS 11/01/2018 1  Not Estab. % Final    ABS. NEUTROPHILS 11/01/2018 3.0  1.4 - 7.0 x10E3/uL Final    Abs Lymphocytes 11/01/2018 1.9  0.7 - 3.1 x10E3/uL Final    ABS. MONOCYTES 11/01/2018 0.3  0.1 - 0.9 x10E3/uL Final    ABS. EOSINOPHILS 11/01/2018 0.1  0.0 - 0.4 x10E3/uL Final    ABS. BASOPHILS 11/01/2018 0.0  0.0 - 0.2 x10E3/uL Final    IMMATURE GRANULOCYTES 11/01/2018 0  Not Estab. % Final    ABS. IMM. GRANS.  11/01/2018 0.0  0.0 - 0.1 x10E3/uL Final    TSH 11/01/2018 1.340  0.450 - 4.500 uIU/mL Final    Thyroid peroxidase Ab 11/01/2018 14  0 - 34 IU/mL Final    Thyroglobulin Ab 11/01/2018 <1.0  0.0 - 0.9 IU/mL Final    Thyroglobulin Antibody measured by Targeter App Methodology    CLASS DESCRIPTION 11/01/2018 Comment   Final    Comment:     Levels of Specific IgE       Class  Description of Class      ---------------------------  -----  --------------------                     < 0.10         0         Negative             0.10 -    0.31         0/I       Equivocal/Low             0.32 -    0.55         I         Low             0.56 -    1.40         II        Moderate             1.41 -    3.90         III       High             3.91 -   19.00         IV        Very High            19.01 -  100.00         V         Very High                    >100.00         VI        Very High      Egg White 11/01/2018 <0.10  Class 0 kU/L Final    Peanut, IgE 11/01/2018 <0.10  Class 0 kU/L Final    Soybean 11/01/2018 <0.10  Class 0 kU/L Final    Milk (Cow) 11/01/2018 <0.10  Class 0 kU/L Final    Clam 11/01/2018 <0.10  Class 0 kU/L Final    Shrimp 11/01/2018 <0.10  Class 0 kU/L Final    Walnuts, IgE 11/01/2018 <0.10  Class 0 kU/L Final    Codfish 11/01/2018 <0.10  Class 0 kU/L Final    Scallops 11/01/2018 <0.10  Class 0 kU/L Final    Wheat 11/01/2018 <0.10  Class 0 kU/L Final    Corn 11/01/2018 <0.10  Class 0 kU/L Final    Sesame Seed 11/01/2018 <0.10  Class 0 kU/L Final           Assessment/ Plan:   Diagnoses and all orders for this visit:    1. Viral upper respiratory tract infection      Mild URI. No SIRS. Antibiotic to treat prolonged symptoms. Trial of otc meds for symptom relief discussed and listed in patient instructions- nasal steroid + mucinex + antihistamine + sinus rinse + otc analgesia + humidifier prn    Advised patient to call if symptoms of sinusitis develop, will send abx if needed. Educated patient on red flag symptoms to warrant return to clinic or emergency room visit. I have discussed the diagnosis with the patient and the intended plan as seen in the above orders. The patient has been offered or received an after-visit summary and questions were answered concerning future plans. I have discussed medication side effects and warnings with the patient as well. Follow-up Disposition:  Return if symptoms worsen or fail to improve.       Signed,    Aaron Graham MD  1/22/2019

## 2023-04-06 NOTE — PROGRESS NOTES
ASSESSMENT/PLAN:  Below is the assessment and plan developed based on review of pertinent history, physical exam, labs, studies, and medications. {There are no diagnoses linked to this encounter. (Refresh or delete this SmartLink)}    No follow-ups on file. In discussion with the patient, we considered the numerus possible diagnoses that could be contributing to their present symptoms. We also deliberated on the extensive management options that must be considered to treat their current condition. We reviewed their accessible prior medical records, diagnostic tests, and current health and employment information. We considered how these symptoms were affecting the patient´s activities of daily living as well as employment and fitness activities. The patient had various questions regarding the possible risks, benefits, complications, morbidity and mortality regarding their diagnosis and treatment options. The patients´ comorbidities were considered, and I advocated that they consider maximizing lifestyle modification through nutrition and exercise to aid in addressing their symptoms. Shared decision making yielded an understanding to move forward with conservation treatment preferences. The patient expressed understanding that if conservative management fails to alleviate the present symptoms they will return to office for re-evaluation and consideration of additional diagnostic tests and potential surgical options. In the interim, we have recommended ice, elevation, and take prescription anti-inflammatory medications along with a physician directed home exercise program. We discussed the risks and common side effects of anti-inflammatory medications and instructed the patient to discontinue the medication and contact us if they experienced any side effects.  The patient was encouraged to discuss the possible side effects with their family physician or pharmacist prior to initiating any new medications. We discussed the fact that many of the recommended treatment options presented are significantly limited by the patient´s social determinants of health. We also reviewed the circumstances surrounding the environment that they live and work which affect a wide range of health risk. We considered the limited access to appropriate educational resources regarding proper nutrition and exercise as well as the economic and social support necessary to maintain health and wellbeing. ***    SUBJECTIVE/OBJECTIVE:  Graham Lo (: 1970) is a 47 y.o. female, patient,here for evaluation of the No chief complaint on file. .   ***     PHYSICAL EXAM:    ***    IMAGING:    I have independently reviewed and interpreted the following images:     2 views of the left tibia and fibula show no evidence of fracture or dislocation. Allergies   Allergen Reactions    Augmentin [Amoxicillin-Pot Clavulanate] Rash       Current Outpatient Medications   Medication Sig    azithromycin (ZITHROMAX) 250 mg tablet Take 2 tablets today, then take 1 tablet daily    diclofenac EC (VOLTAREN) 25 mg EC tablet Take 1 Tab by mouth two (2) times daily as needed for Pain.    loratadine (CLARITIN) 10 mg tablet Take 10 mg by mouth daily. oseltamivir (TAMIFLU) 75 mg capsule     guaiFENesin-codeine (ROBITUSSIN AC) 100-10 mg/5 mL solution Take 5 mL by mouth three (3) times daily as needed for Cough. Max Daily Amount: 15 mL. Indications: COLD SYMPTOMS, Cough    mometasone (NASONEX) 50 mcg/actuation nasal spray 2 Sprays daily. fexofenadine (ALLEGRA) 180 mg tablet Take 1 Tab by mouth daily. Indications: SEASONAL ALLERGIC RHINITIS    RANITIDINE HCL (ZANTAC PO) Take  by mouth. B.infantis-B.ani-B.long-B.bifi (PROBIOTIC 4X) 10-15 mg TbEC Take  by mouth. ondansetron hcl (ZOFRAN) 4 mg tablet Take 4 mg by mouth every eight (8) hours as needed for Nausea.     azelaic acid (FINACEA) 15 % topical gel Apply  to affected area two (2) times a day. No current facility-administered medications for this visit. Past Medical History:   Diagnosis Date    Gastroparesis     Ovarian cyst, left     Shortness of breath     Normal Spirometry and Pulmonary Evaluation       Past Surgical History:   Procedure Laterality Date    HX CHOLECYSTECTOMY         Family History   Problem Relation Age of Onset    Hypertension Father     OSTEOARTHRITIS Father     Heart Disease Maternal Grandfather        Social History     Socioeconomic History    Marital status:      Spouse name: Not on file    Number of children: Not on file    Years of education: Not on file    Highest education level: Not on file   Occupational History    Not on file   Tobacco Use    Smoking status: Never    Smokeless tobacco: Never   Substance and Sexual Activity    Alcohol use: No     Alcohol/week: 0.0 standard drinks    Drug use: No    Sexual activity: Yes     Partners: Male     Birth control/protection: None     Comment: vasectomy   Other Topics Concern    Not on file   Social History Narrative    Not on file     Social Determinants of Health     Financial Resource Strain: Not on file   Food Insecurity: Not on file   Transportation Needs: Not on file   Physical Activity: Not on file   Stress: Not on file   Social Connections: Not on file   Intimate Partner Violence: Not on file   Housing Stability: Not on file       Review of Systems    No flowsheet data found. Vitals: There were no vitals taken for this visit. There is no height or weight on file to calculate BMI. An electronic signature was used to authenticate this note.   -- Mimi Castaneda MD

## 2023-04-07 ENCOUNTER — OFFICE VISIT (OUTPATIENT)
Dept: ORTHOPEDIC SURGERY | Age: 53
End: 2023-04-07
Payer: COMMERCIAL

## 2023-04-07 PROCEDURE — 99203 OFFICE O/P NEW LOW 30 MIN: CPT | Performed by: ORTHOPAEDIC SURGERY

## 2024-03-29 ENCOUNTER — TELEPHONE (OUTPATIENT)
Age: 54
End: 2024-03-29

## 2024-03-29 NOTE — TELEPHONE ENCOUNTER
LVM for patient to call us back to schedule NP mary with Apar[facility] Chappell, per provider ok to schedule next available NP mary

## 2024-06-22 ENCOUNTER — HOSPITAL ENCOUNTER (EMERGENCY)
Facility: HOSPITAL | Age: 54
Discharge: HOME | End: 2024-06-22
Attending: EMERGENCY MEDICINE
Payer: COMMERCIAL

## 2024-06-22 VITALS
BODY MASS INDEX: 25.91 KG/M2 | HEIGHT: 59 IN | OXYGEN SATURATION: 100 % | TEMPERATURE: 98 F | SYSTOLIC BLOOD PRESSURE: 127 MMHG | DIASTOLIC BLOOD PRESSURE: 81 MMHG | HEART RATE: 62 BPM | WEIGHT: 128.53 LBS | RESPIRATION RATE: 18 BRPM

## 2024-06-22 DIAGNOSIS — T63.481A INSECT STINGS, ACCIDENTAL OR UNINTENTIONAL, INITIAL ENCOUNTER: Primary | ICD-10-CM

## 2024-06-22 PROCEDURE — 99282 EMERGENCY DEPT VISIT SF MDM: CPT

## 2024-06-22 ASSESSMENT — PAIN SCALES - GENERAL: PAINLEVEL_OUTOF10: 8

## 2024-06-22 ASSESSMENT — PAIN - FUNCTIONAL ASSESSMENT: PAIN_FUNCTIONAL_ASSESSMENT: 0-10

## 2024-06-22 ASSESSMENT — PAIN DESCRIPTION - ORIENTATION: ORIENTATION: RIGHT

## 2024-06-22 ASSESSMENT — LIFESTYLE VARIABLES
HOW MANY STANDARD DRINKS CONTAINING ALCOHOL DO YOU HAVE ON A TYPICAL DAY: PATIENT DECLINED
HOW OFTEN DO YOU HAVE A DRINK CONTAINING ALCOHOL: PATIENT DECLINED

## 2024-06-22 ASSESSMENT — PAIN DESCRIPTION - LOCATION: LOCATION: FOOT

## 2024-06-23 NOTE — ED PROVIDER NOTES
[facility] EMERGENCY CTR  EMERGENCY DEPARTMENT ENCOUNTER      Patient Name: Thu Solis  MRN: [id number]  Birthdate 1970  Date of Evaluation: 6/22/2024  Physician: Arash Lopez MD    CHIEF COMPLAINT       Chief Complaint   Patient presents with    Insect Bite       HISTORY OF PRESENT ILLNESS   (Location/Symptom, Timing/Onset, Context/Setting, Quality, Duration, Modifying Factors, Severity)   Thu Solis, 54 y.o., female     54-year-old female presents with chief complaint of insect stings.  Patient was stung multiple times about an hour prior to arrival.  She has no systemic symptoms including shortness of breath, throat closing, nausea or other symptoms.  She states that the red areas where she was stung have actually improved since arrival          Nursing Notes were reviewed.    REVIEW OF SYSTEMS    (Not required)   Review of Systems    Except as noted above the remainder of the review of systems was reviewed and negative.     PAST MEDICAL HISTORY     Past Medical History:   Diagnosis Date    Gastroparesis     Ovarian cyst, left     Shortness of breath     Normal Spirometry and Pulmonary Evaluation       SURGICAL HISTORY       Past Surgical History:   Procedure Laterality Date    CHOLECYSTECTOMY         CURRENT MEDICATIONS       Previous Medications    IBUPROFEN (ADVIL;MOTRIN) 200 MG TABLET    Take 1 tablet by mouth every 6 hours as needed for Pain    LO LOESTRIN FE 1 MG-10 MCG / 10 MCG TABLET    Take 1 tablet by mouth daily       ALLERGIES     Bee venom    FAMILY HISTORY       Family History   Problem Relation Age of Onset    Hypertension Father     Osteoarthritis Father     Heart Disease Maternal Grandfather         SOCIAL HISTORY       Social History     Socioeconomic History    Marital status:    Tobacco Use    Smoking status: Never     Passive exposure: Never    Smokeless tobacco: Never   Vaping Use    Vaping Use: Never used   Substance and Sexual Activity    Alcohol use: Yes    Drug use:

## 2024-06-23 NOTE — ED TRIAGE NOTES
Patient presents to the emergency department reporting multiple insect bites/stings about an hour ago.  Patient took a Benadryl and Ibuprofen.  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  She wanted to get checked out.

## 2024-06-23 NOTE — ED NOTES
[facility] [place] [facility] Nursing Note        Patient Name: Thu Solis      : 1970             MRN: [id number]      Chief Complaint: Insect Bite      Admit Diagnosis: No admission diagnoses are documented for this encounter.      Surgery: * No surgery found *            MD/RN reviewed discharge instructions and options with patient. Patient verbalized understanding. RN reviewed discharge instructions using teach back method. Patient ambulatory to exit without difficulty and no acute signs of distress. No complaints or needs expressed at this time. Patient counseled on medications prescribed at discharge (If prescribed). Vital signs stable. Patient to follow up with PCP/Specialist on the next business day for appointment. All questions answered by ER RN.          Lines:        Vitals: Patient Vitals for the past 12 hrs:   Temp Pulse Resp BP SpO2   24 2300 98 °F (36.7 °C) 62 18 127/81 100 %   24 2240 -- -- -- -- 97 %   24 2235 98.1 °F (36.7 °C) 64 16 132/84 98 %         Signed by: Imer Meek RN, KARIN, BSN, CMSRN                                              2024 at 11:03 PM